# Patient Record
Sex: FEMALE | Race: WHITE | NOT HISPANIC OR LATINO | Employment: OTHER | URBAN - METROPOLITAN AREA
[De-identification: names, ages, dates, MRNs, and addresses within clinical notes are randomized per-mention and may not be internally consistent; named-entity substitution may affect disease eponyms.]

---

## 2017-01-16 ENCOUNTER — CONVERSION ENCOUNTER (OUTPATIENT)
Dept: MAMMOGRAPHY | Facility: HOSPITAL | Age: 57
End: 2017-01-16

## 2018-03-23 ENCOUNTER — CONVERSION ENCOUNTER (OUTPATIENT)
Dept: MAMMOGRAPHY | Facility: HOSPITAL | Age: 58
End: 2018-03-23

## 2019-02-11 ENCOUNTER — OFFICE VISIT CONVERTED (OUTPATIENT)
Dept: ORTHOPEDIC SURGERY | Facility: CLINIC | Age: 59
End: 2019-02-11
Attending: ORTHOPAEDIC SURGERY

## 2019-03-06 ENCOUNTER — OFFICE VISIT CONVERTED (OUTPATIENT)
Dept: ORTHOPEDIC SURGERY | Facility: CLINIC | Age: 59
End: 2019-03-06
Attending: PHYSICIAN ASSISTANT

## 2019-03-18 ENCOUNTER — HOSPITAL ENCOUNTER (OUTPATIENT)
Dept: ORTHOPEDIC SURGERY | Facility: CLINIC | Age: 59
Setting detail: RECURRING SERIES
Discharge: HOME | End: 2019-05-20
Attending: ORTHOPAEDIC SURGERY

## 2019-04-10 ENCOUNTER — OFFICE VISIT CONVERTED (OUTPATIENT)
Dept: ORTHOPEDIC SURGERY | Facility: CLINIC | Age: 59
End: 2019-04-10
Attending: PHYSICIAN ASSISTANT

## 2019-05-08 ENCOUNTER — OFFICE VISIT CONVERTED (OUTPATIENT)
Dept: ORTHOPEDIC SURGERY | Facility: CLINIC | Age: 59
End: 2019-05-08
Attending: PHYSICIAN ASSISTANT

## 2019-07-11 ENCOUNTER — HOSPITAL ENCOUNTER (OUTPATIENT)
Dept: GENERAL RADIOLOGY | Facility: HOSPITAL | Age: 59
Discharge: HOME | End: 2019-07-11
Attending: FAMILY MEDICINE

## 2020-02-10 ENCOUNTER — HOSPITAL ENCOUNTER (OUTPATIENT)
Dept: MAMMOGRAPHY | Facility: HOSPITAL | Age: 60
Discharge: HOME | End: 2020-02-10
Attending: OBSTETRICS & GYNECOLOGY

## 2020-07-08 ENCOUNTER — HOSPITAL ENCOUNTER (OUTPATIENT)
Dept: CARDIOLOGY | Facility: HOSPITAL | Age: 60
Discharge: HOME | End: 2020-07-08
Attending: FAMILY MEDICINE

## 2021-02-13 ENCOUNTER — HOSPITAL ENCOUNTER (OUTPATIENT)
Dept: MAMMOGRAPHY | Facility: HOSPITAL | Age: 61
Discharge: HOME | End: 2021-02-13
Attending: NURSE PRACTITIONER

## 2021-05-15 VITALS — OXYGEN SATURATION: 99 % | HEART RATE: 81 BPM | HEIGHT: 65 IN | BODY MASS INDEX: 27.66 KG/M2 | WEIGHT: 166 LBS

## 2021-05-16 VITALS — HEART RATE: 80 BPM | HEIGHT: 65 IN | OXYGEN SATURATION: 97 % | BODY MASS INDEX: 27.99 KG/M2 | WEIGHT: 168 LBS

## 2021-05-16 VITALS — OXYGEN SATURATION: 97 % | WEIGHT: 171 LBS | HEART RATE: 76 BPM | BODY MASS INDEX: 28.49 KG/M2 | HEIGHT: 65 IN

## 2021-05-23 ENCOUNTER — HOSPITAL ENCOUNTER (OUTPATIENT)
Dept: URGENT CARE | Facility: CLINIC | Age: 61
Discharge: HOME | End: 2021-05-23
Attending: FAMILY MEDICINE

## 2021-06-01 ENCOUNTER — OFFICE VISIT CONVERTED (OUTPATIENT)
Dept: FAMILY MEDICINE CLINIC | Facility: CLINIC | Age: 61
End: 2021-06-01
Attending: NURSE PRACTITIONER

## 2021-06-01 ENCOUNTER — CONVERSION ENCOUNTER (OUTPATIENT)
Dept: FAMILY MEDICINE CLINIC | Facility: CLINIC | Age: 61
End: 2021-06-01

## 2021-06-01 ENCOUNTER — HOSPITAL ENCOUNTER (OUTPATIENT)
Dept: FAMILY MEDICINE CLINIC | Facility: CLINIC | Age: 61
Discharge: HOME | End: 2021-06-01
Attending: NURSE PRACTITIONER

## 2021-06-01 LAB
25(OH)D3 SERPL-MCNC: 43.1 NG/ML (ref 30–100)
CHOLEST SERPL-MCNC: 263 MG/DL (ref 107–200)
CHOLEST/HDLC SERPL: 2.9 {RATIO} (ref 3–6)
HDLC SERPL-MCNC: 90 MG/DL (ref 40–60)
LDLC SERPL CALC-MCNC: 164 MG/DL (ref 70–100)
T4 FREE SERPL-MCNC: 1.1 NG/DL (ref 0.9–1.8)
TRIGL SERPL-MCNC: 47 MG/DL (ref 40–150)
TSH SERPL-ACNC: 2.51 M[IU]/L (ref 0.27–4.2)
VLDLC SERPL-MCNC: 9 MG/DL (ref 5–37)

## 2021-06-02 ENCOUNTER — HOSPITAL ENCOUNTER (OUTPATIENT)
Dept: FAMILY MEDICINE CLINIC | Facility: CLINIC | Age: 61
Discharge: HOME | End: 2021-06-02
Attending: NURSE PRACTITIONER

## 2021-06-02 LAB — CONV UREA BREATH TEST: NEGATIVE

## 2021-06-05 NOTE — H&P
History and Physical      Patient Name: Aleyda Cortés   Patient ID: [id number]   Sex: Female   YOB: 1960    Primary Care Provider: Jack Arambula MD   Referring Provider: Jack Arambula MD    Visit Date: June 1, 2021    Provider: JYOTI Edgar   Location: [facility]   Location Address: [address] [place] [address], [address]  [place], [place]  [id number]   Location Phone: [phone]          Chief Complaint  · est care      History Of Present Illness  Aleyda Cortés is a 60 year old /White female who presents for evaluation and treatment of:      Presents today to [facility] care.  Previous PCP is Dr. Baumann.  She has a history of vitamin D deficiency, squamous cell carcinoma, hysterectomy with hormone therapy, depression, and ADHD.  She recently went to the emergency room on 5/23/2021 for abdominal pain.  She had an abnormal CT scan of abdomen with thickening of the wall near gastric pylorus.  She was started on Carafate, Zofran, and dicyclomine.  Symptoms have improved.  She reports over the past 6 months she has had increased and heartburn which she has been taking Tums and Rolaids.    History of colonoscopy with polypectomy by Dr. Lobo.  She reports she is overdue for colonoscopy.    She recently saw OB/GYN and was restarted on hormone therapy for vaginal dryness.    Former smoker quit 2003 she has smoked approximately 25 years.    She reports she has a history of ADHD.  She has been previously prescribed Adderall.  She reports Adderall helped her with her focus and driving.       Past Medical History  Anxiety; Arthritis; Bladder disorder; Cervical disc disorder; Cervical radiculopathy; Cervicalgia; Depression; Hearing loss; Hyperlipemia; Low back pain; Tinnitus; TMJ disorder involving articular disc abnormality         Past Surgical History  Bladder Surg.; Cesarian Section; Colonoscopy; Dilation and Curettage; Fatty TUMOR; Hysterectomy; Tonsilectomy;  Tonsillectomy         Medication List  Adderall 20 mg oral tablet; Calcium 600 600 mg (1,500 mg) oral tablet; cyclobenzaprine 10 mg oral tablet; dextroamphetamine-amphetamine 20 mg oral capsule,extended release 24hr; diclofenac sodium 75 mg oral tablet,delayed release (DR/EC); escitalopram oxalate 10 mg oral tablet; Lexapro 10 mg oral tablet; Medrol (Beau) 4 mg oral tablets,dose pack; multivitamin oral tablet; Suprep 17.5-3.13-1.6 gram oral recon soln; Vivelle 0.075 mg/24 hr transdermal patch semiweekly         Allergy List  Darvon; Ritalin; SULFA (SULFONAMIDES)         Family Medical History  Stroke; Heart Disease; Cancer, Unspecified; Diabetes, unspecified type; Spine Problems; - No Family History of Colorectal Cancer; Heart Attack (MI); Diabetes Mellitus, Type II; Family history of certain chronic disabling diseases; arthritis         Social History  Alcohol (Current some day); Alcohol Use (Current some day); .; lives alone; Recreational Drug Use (Never); Tobacco (Former); Working         Review of Systems  · Constitutional  o Denies  o : fever, fatigue, weight loss, weight gain  · Eyes  o Denies  o : double vision, blurred vision, changes in vision  · HENT  o Denies  o : vertigo, recent head injury  · Cardiovascular  o Denies  o : lower extremity edema, claudication, chest pressure, palpitations  · Respiratory  o Denies  o : shortness of breath, wheezing, cough, hemoptysis, dyspnea on exertion  · Gastrointestinal  o Admits  o : nausea, vomiting, diarrhea, abdominal pain, bloating  · Genitourinary  o Denies  o : urgency, frequency, dysuria, urinary retention  · Integument  o Denies  o : rash, itching, hair growth change, new skin lesions  · Neurologic  o Denies  o : altered mental status, tingling or numbness, seizures, tremors  · Musculoskeletal  o Denies  o : joint swelling, limitation of motion  · Endocrine  o Denies  o : polyuria, polydipsia, cold intolerance, heat  "intolerance  · Psychiatric  o Admits  o : hallucinations  o Denies  o : anxiety, depression, delusions, feeling confused, difficulty sleeping, compulsive behaviors, impulsive behaviors, suicidal ideation, homicidal ideation      Vitals  Date Time BP Position Site L\R Cuff Size HR RR TEMP (F) WT  HT  BMI kg/m2 BSA m2 O2 Sat FR L/min FiO2 HC       06/01/2021 11:04 /72 Sitting    75 - R  97.8 165lbs 16oz 5'  5\" 27.62 1.86 99 %            Physical Examination  · Constitutional  o Appearance  o : alert, in no acute distress  · Head and Face  o Head  o :   § Inspection  § : atraumatic, normocephalic  o Face  o :   § Inspection  § : no facial lesions  o HEENT  o : Unremarkable  · Eyes  o Conjunctivae  o : conjunctivae normal  o Sclerae  o : sclerae white  o Pupils and Irises  o : pupils equal and round, pupils reactive to light bilaterally  o Eyelids/Ocular Adnexae  o : eyelid appearance normal  · Ears, Nose, Mouth and Throat  o Ears  o :   § External Ears  § : appearance within normal limits, no lesions present  § Otoscopic Examination  § : tympanic membrane appearance within normal limits bilaterally  o Nose  o :   § External Nose  § : appearance normal  o Oral Cavity  o :   § Oral Mucosa  § : oral mucosa normal  § Lips  § : lip appearance normal  § Teeth  § : normal dentition for age  § Gums  § : gums pink, non-swollen, no bleeding present  § Tongue  § : tongue appearance normal  § Palate  § : hard palate normal, soft palate appearance normal  o Throat  o : No erythema  · Neck  o Inspection/Palpation  o : normal appearance, no masses or tenderness, trachea midline  o Thyroid  o : gland size normal, nontender, no nodules or masses present on palpation  · Respiratory  o Respiratory Effort  o : breathing unlabored  o Auscultation of Lungs  o : normal breath sounds  · Cardiovascular  o Heart  o :   § Auscultation of Heart  § : regular rate, normal rhythm, no murmurs present  o Peripheral Vascular System  o : "   § Extremities  § : no edema  · Gastrointestinal  o Abdominal Examination  o : abdomen nontender to palpation, normal bowel sounds, tone normal without rigidity or guarding, no masses present  o Liver and spleen  o : no hepatomegaly present  · Lymphatic  o Neck  o : no lymphadenopathy   o Supraclavicular Nodes  o : no supraclavicular nodes          Assessment  · Annual physical exam     V70.0/Z00.00  [facility]. Check lipid panel and thyroid level  · GERD (gastroesophageal reflux disease)     530.81/K21.9  H. pylori breath test. Start omeprazole 20 mg daily. Continue Carafate. Abnormal CT scan of abdomen with thickening wall of the gastric pylorus. Prescribed Prilosec continue Carafate, consult gastroenterology for further evaluation. She is also in need of a colonoscopy for colorectal cancer screening.  · Vitamin D deficiency     268.9/E55.9  Check vitamin D level  · Screening for lipid disorders     V77.91/Z13.220  · Screening for colon cancer     V76.51/Z12.11  · Elevated blood sugar     790.29/R73.9  · Abnormal CT of the abdomen     793.6/R93.5  · ADHD     314.01/F90.9      Plan  · Orders  o H pylori breath test (86625, 29792) - 530.81/K21.9 - 06/01/2021  o Gastroenterology Consultation (GASTR) - 530.81/K21.9, V76.51/Z12.11, 793.6/R93.5 - 06/01/2021   she has seen Dr. Lobo for her colonoscopies. Previous colonoscopy she had polepectomy  o Lipid Panel [facility] (98359) - V77.91/Z13.220 - 06/01/2021  o Thyroid Profile (81775, 36525, THYII) - V70.0/Z00.00 - 06/01/2021  o Vitamin D (25-Hydroxy) Level (89727) - 268.9/E55.9 - 06/01/2021  o ACO-18: Negative screen for clinical depression using a standardized tool () - - 06/01/2021   2  o ACO-39: Current medications updated and reviewed (1159F, ) - - 06/01/2021  o Psychiatric Consultation (PSYCH) - 314.01/F90.9 - 06/01/2021  · Medications  o omeprazole 20 mg oral capsule,delayed release(DR/EC)   SIG: take 1  capsule (20 mg) by oral route once daily before a meal   DISP: (30) Capsule with 2 refills  Prescribed on 06/01/2021     o Carafate 1 gram oral tablet   SIG: take 1 tablet (1 gram) by oral route 4 times per day on an empty stomach 1 hour before meals and at bedtime for 14 days   DISP: (56) Tablet with 1 refills  Prescribed on 06/01/2021     · Instructions  o Reviewed health maintenance flowsheet and updated information. Orders were placed and/or patient's response was documented.  o Patient was educated/instructed on their diagnosis, treatment and medications prior to discharge from the clinic today.  o Patient instructed to seek medical attention urgently for new or worsening symptoms.  o Call the office with any concerns or questions.  · Disposition  o Call or Return if symptoms worsen or persist.  o f/u 1 month            Electronically Signed by: JYOTI Edgar -Author on June 1, 2021 02:25:35 PM

## 2021-07-06 ENCOUNTER — OFFICE VISIT (OUTPATIENT)
Dept: FAMILY MEDICINE CLINIC | Facility: CLINIC | Age: 61
End: 2021-07-06

## 2021-07-06 VITALS
HEIGHT: 65 IN | TEMPERATURE: 97.2 F | OXYGEN SATURATION: 97 % | SYSTOLIC BLOOD PRESSURE: 132 MMHG | WEIGHT: 166.6 LBS | HEART RATE: 84 BPM | DIASTOLIC BLOOD PRESSURE: 72 MMHG | BODY MASS INDEX: 27.76 KG/M2

## 2021-07-06 DIAGNOSIS — E78.2 MIXED HYPERLIPIDEMIA: ICD-10-CM

## 2021-07-06 DIAGNOSIS — K21.9 GASTROESOPHAGEAL REFLUX DISEASE WITHOUT ESOPHAGITIS: Primary | ICD-10-CM

## 2021-07-06 PROBLEM — M26.639 TMJ DISORDER INVOLVING ARTICULAR DISC ABNORMALITY: Status: ACTIVE | Noted: 2021-07-06

## 2021-07-06 PROBLEM — E78.5 HYPERLIPEMIA: Status: ACTIVE | Noted: 2021-07-06

## 2021-07-06 PROBLEM — E55.9 VITAMIN D DEFICIENCY: Status: ACTIVE | Noted: 2021-06-01

## 2021-07-06 PROBLEM — M54.12 CERVICAL RADICULOPATHY: Status: ACTIVE | Noted: 2017-05-15

## 2021-07-06 PROBLEM — M19.90 ARTHRITIS: Status: ACTIVE | Noted: 2021-07-06

## 2021-07-06 PROBLEM — M54.50 LOW BACK PAIN: Status: ACTIVE | Noted: 2017-05-15

## 2021-07-06 PROBLEM — H93.19 TINNITUS: Status: ACTIVE | Noted: 2021-07-06

## 2021-07-06 PROCEDURE — 99213 OFFICE O/P EST LOW 20 MIN: CPT | Performed by: NURSE PRACTITIONER

## 2021-07-06 RX ORDER — ESTRADIOL 0.05 MG/D
1 FILM, EXTENDED RELEASE TRANSDERMAL 2 TIMES WEEKLY
COMMUNITY
Start: 2021-04-20 | End: 2022-03-30

## 2021-07-06 RX ORDER — LATANOPROST 50 UG/ML
SOLUTION/ DROPS OPHTHALMIC
COMMUNITY
Start: 2021-04-20

## 2021-07-06 RX ORDER — DICYCLOMINE HCL 20 MG
20 TABLET ORAL DAILY PRN
COMMUNITY
Start: 2021-05-23 | End: 2021-12-03

## 2021-07-06 RX ORDER — OMEPRAZOLE 20 MG/1
20 CAPSULE, DELAYED RELEASE ORAL DAILY
COMMUNITY
Start: 2021-06-03 | End: 2021-07-06 | Stop reason: SDUPTHER

## 2021-07-06 RX ORDER — AMMONIUM LACTATE 12 G/100G
CREAM TOPICAL
COMMUNITY

## 2021-07-06 RX ORDER — SUCRALFATE 1 G/1
1 TABLET ORAL DAILY
COMMUNITY
Start: 2021-06-03 | End: 2021-07-06

## 2021-07-06 RX ORDER — OMEPRAZOLE 20 MG/1
20 CAPSULE, DELAYED RELEASE ORAL DAILY
Qty: 90 CAPSULE | Refills: 1 | Status: SHIPPED | OUTPATIENT
Start: 2021-07-06 | End: 2021-07-12 | Stop reason: SDUPTHER

## 2021-07-06 NOTE — PATIENT INSTRUCTIONS
Food Choices for Gastroesophageal Reflux Disease, Adult  When you have gastroesophageal reflux disease (GERD), the foods you eat and your eating habits are very important. Choosing the right foods can help ease your discomfort. Think about working with a food expert (dietitian) to help you make good choices.  What are tips for following this plan?  Reading food labels  · Read the label for foods that are low in saturated fat. Foods that may help with your symptoms include:  ? Foods that have less than 5% of daily value (DV) of fat.  ? Foods that have 0 grams of trans fat.  Cooking  · Do not mora your food. Cook your food by baking, steaming, grilling, or broiling. These are all methods that do not need a lot of fat for cooking.  · To add flavor, try to use herbs that are low in spice and acidity.  Meal planning    · Choose healthy foods that are low in fat, such as:  ? Fruits and vegetables.  ? Whole grains.  ? Low-fat dairy products.  ? Lean meats, fish, and poultry.  · Eat small meals often instead of eating 3 large meals each day. Eat your meals slowly in a place where you are relaxed. Avoid bending over or lying down until 2-3 hours after eating.  · Limit high-fat foods such as fatty meats or fried foods.  · Limit your intake of oils, butter, and shortening to less than 8 teaspoons each day.  · Avoid the following:  ? Foods that cause symptoms. These may be different for different people. Keep a food diary to keep track of foods that cause symptoms.  ? Alcohol.  ? Drinking a lot of liquid with meals.  ? Eating meals during the 2-3 hours before bed.  Lifestyle  · Stay at a healthy weight. Ask your doctor what weight is healthy for you. If you need to lose weight, work with your doctor to do so safely.  · Exercise for at least 30 minutes on 5 or more days each week, or as told by your doctor.  · Wear loose-fitting clothes.  · Do not use any products that contain nicotine or tobacco, such as cigarettes,  e-cigarettes, and chewing tobacco. If you need help quitting, ask your doctor.  · Sleep with the head of your bed higher than your feet. Use a wedge under the mattress or blocks under the bed frame to raise the head of the bed.  What foods should eat?    Eat a healthy, well-balanced diet of fruits, vegetables, whole grains, low-fat dairy products, lean meats, fish, and poultry. Each person is different. Foods that may cause symptoms in one person may not cause any symptoms in another person. Work with your doctor to find foods that are safe for you.  The items listed above may not be a complete list of foods and beverages you can eat. Contact a dietitian for more information.  What foods should I avoid?  Limiting some of these foods may help in managing the symptoms of GERD. Everyone is different. Talk with a food expert or your doctor to help you find the exact foods to avoid, if any.  Fruits  Any fruits prepared with added fat. Any fruits that cause symptoms. For some people, this may include citrus fruits, such as oranges, grapefruit, pineapple, and mayda.  Vegetables  Deep-fried vegetables. French fries. Any vegetables prepared with added fat. Any vegetables that cause symptoms. For some people, this may include tomatoes and tomato products, chili peppers, onions and garlic, and horseradish.  Grains  Pastries or quick breads with added fat.  Meats and other proteins  High-fat meats, such as fatty beef or pork, hot dogs, ribs, ham, sausage, salami, and gillette. Fried meat or protein, including fried fish and fried chicken. Nuts and nut butters.  Dairy  Whole milk and chocolate milk. Sour cream. Cream. Ice cream. Cream cheese. Milkshakes.  Fats and oils  Butter. Margarine. Shortening. Ghee.  Beverages  Coffee and tea, with or without caffeine. Carbonated beverages. Sodas. Energy drinks. Fruit juice made with acidic fruits (such as orange or grapefruit). Tomato juice. Alcoholic drinks.  Sweets and  desserts  Chocolate and cocoa. Donuts.  Seasonings and condiments  Pepper. Peppermint and spearmint. Added salt. Any condiments, herbs, or seasonings that cause symptoms. For some people, this may include sharif, hot sauce, or vinegar-based salad dressings.  The items listed above may not be a complete list of foods and beverages you should avoid. Contact a dietitian for more information.  Questions to ask your doctor  Diet and lifestyle changes are often the first steps that are taken to manage symptoms of GERD. If diet and lifestyle changes do not help, talk with your doctor about taking medicines.  Where to find more information  · International Foundation for Gastrointestinal Disorders: aboutgerd.org  Summary  · When you have GERD, food and lifestyle choices are very important in easing your symptoms.  · Eat small meals often instead of 3 large meals a day. Eat your meals slowly and in a place where you are relaxed.  · Avoid bending over or lying down until 2-3 hours after eating.  · Limit high-fat foods such as fatty meat or fried foods.  This information is not intended to replace advice given to you by your health care provider. Make sure you discuss any questions you have with your health care provider.  Document Revised: 10/12/2020 Document Reviewed: 10/12/2020  Elsevier Patient Education © 2021 Else[employer] Inc.

## 2021-07-06 NOTE — PROGRESS NOTES
"Chief Complaint  Follow-up and GI Problem    Subjective          Aleydabinta Cortés presents to [facility]  History of Present Illness  Resents today for a 1 month follow-up on abdominal pain acid reflux.  Over the past month she has been taking omeprazole 20 mg daily.  She had completed her Carafate.  Symptoms have completely resolved.  She notices if she overeats that she will have reflux symptoms.  She has an appointment later this month with GI.  She is in need of a colonoscopy.  She also had an abnormal CT scan that showed thickening of the gastric pylorus.  CT scan also noted sigmoid diverticulosis.    She has hyperlipidemia.  And is following a low-cholesterol diet.    Social History     Socioeconomic History   • Marital status:      Spouse name: Not on file   • Number of children: Not on file   • Years of education: Not on file   • Highest education level: Not on file   Tobacco Use   • Smoking status: Former Smoker     Packs/day: 1.00     Years: 30.00     Pack years: 30.00     Quit date:      Years since quittin.5   • Smokeless tobacco: Never Used   Substance and Sexual Activity   • Alcohol use: Yes     Comment: 5 drinks a week        Objective   Vital Signs:   /72   Pulse 84   Temp 97.2 °F (36.2 °C)   Ht 165.1 cm (65\")   Wt 75.6 kg (166 lb 9.6 oz)   SpO2 97%   BMI 27.72 kg/m²     Physical Exam  Vitals reviewed.   Constitutional:       Appearance: Normal appearance. She is well-developed.   HENT:      Head: Normocephalic and atraumatic.      Right Ear: External ear normal.      Left Ear: External ear normal.      Mouth/Throat:      Pharynx: No oropharyngeal exudate.   Eyes:      Conjunctiva/sclera: Conjunctivae normal.      Pupils: Pupils are equal, round, and reactive to light.   Cardiovascular:      Rate and Rhythm: Normal rate and regular rhythm.      Heart sounds: No murmur heard.   No friction rub. No gallop.    Pulmonary:      Effort: " Pulmonary effort is normal.      Breath sounds: Normal breath sounds. No wheezing or rhonchi.   Abdominal:      General: Bowel sounds are normal. There is no distension.      Palpations: Abdomen is soft.      Tenderness: There is no abdominal tenderness.   Skin:     General: Skin is warm and dry.   Neurological:      Mental Status: She is alert and oriented to person, place, and time.   Psychiatric:         Mood and Affect: Affect normal.        Result Review :     Common labs    Common Labsle 5/23/21 5/23/21 6/1/21 2039 2039    Glucose  102 (A)    BUN  15    Creatinine  0.75    Sodium  138    Potassium  4.1    Chloride  101    Calcium  9.7    Albumin  4.2    Total Bilirubin  0.98    Alkaline Phosphatase  61    AST (SGOT)  18    ALT (SGPT)  13    WBC 7.13     Hemoglobin 13.8     Hematocrit 40.7     Platelets 239     Total Cholesterol   263 (A)   Triglycerides   47   HDL Cholesterol   90 (A)   LDL Cholesterol    164 (A)   (A) Abnormal value       Comments are available for some flowsheets but are not being displayed.         The 10-year ASCVD risk score (Ceepj DOBSON Jr., et al., 2013) is: 3.9%    Values used to calculate the score:      Age: 60 years      Sex: Female      Is Non- : No      Diabetic: No      Tobacco smoker: No      Systolic Blood Pressure: 132 mmHg      Is BP treated: No      HDL Cholesterol: 90 mg/dL      Total Cholesterol: 263 mg/dL              Assessment and Plan    Diagnoses and all orders for this visit:    1. Gastroesophageal reflux disease without esophagitis (Primary)  Assessment & Plan:  Continue omeprazole 20 mg daily.  GERD is well controlled.    Orders:  -     omeprazole (priLOSEC) 20 MG capsule; Take 1 capsule by mouth Daily.  Dispense: 90 capsule; Refill: 1    2. Mixed hyperlipidemia  Assessment & Plan:  Follow low-cholesterol diet.        Follow Up   Return in about 3 months (around 10/6/2021) for Next scheduled follow up.  Patient was given instructions and  counseling regarding her condition or for health maintenance advice. Please see specific information pulled into the AVS if appropriate.

## 2021-07-08 ENCOUNTER — PREP FOR SURGERY (OUTPATIENT)
Dept: OTHER | Facility: HOSPITAL | Age: 61
End: 2021-07-08

## 2021-07-08 ENCOUNTER — TELEPHONE (OUTPATIENT)
Dept: GASTROENTEROLOGY | Facility: CLINIC | Age: 61
End: 2021-07-08

## 2021-07-08 DIAGNOSIS — Z86.010 HISTORY OF COLON POLYPS: Primary | ICD-10-CM

## 2021-07-08 DIAGNOSIS — Z12.11 ENCOUNTER FOR SCREENING FOR MALIGNANT NEOPLASM OF COLON: Primary | ICD-10-CM

## 2021-07-08 RX ORDER — SODIUM, POTASSIUM,MAG SULFATES 17.5-3.13G
1 SOLUTION, RECONSTITUTED, ORAL ORAL EVERY 12 HOURS
Qty: 254 ML | Refills: 0 | Status: SHIPPED | OUTPATIENT
Start: 2021-07-08 | End: 2022-01-11

## 2021-07-08 NOTE — PROGRESS NOTES
Spoke with Pt on a date for colon of 9/13/2021. Updated chart with meds and history. Put in order for colon and sent in prep

## 2021-07-12 DIAGNOSIS — K21.9 GASTROESOPHAGEAL REFLUX DISEASE WITHOUT ESOPHAGITIS: ICD-10-CM

## 2021-07-12 RX ORDER — OMEPRAZOLE 20 MG/1
20 CAPSULE, DELAYED RELEASE ORAL DAILY
Qty: 90 CAPSULE | Refills: 1 | Status: SHIPPED | OUTPATIENT
Start: 2021-07-12 | End: 2021-10-15

## 2021-07-14 ENCOUNTER — CLINICAL SUPPORT (OUTPATIENT)
Dept: GASTROENTEROLOGY | Facility: CLINIC | Age: 61
End: 2021-07-14

## 2021-07-15 VITALS
SYSTOLIC BLOOD PRESSURE: 120 MMHG | HEIGHT: 65 IN | TEMPERATURE: 97.8 F | WEIGHT: 166 LBS | OXYGEN SATURATION: 99 % | HEART RATE: 75 BPM | DIASTOLIC BLOOD PRESSURE: 72 MMHG | BODY MASS INDEX: 27.66 KG/M2

## 2021-09-09 ENCOUNTER — TELEPHONE (OUTPATIENT)
Dept: GASTROENTEROLOGY | Facility: CLINIC | Age: 61
End: 2021-09-09

## 2021-09-09 NOTE — TELEPHONE ENCOUNTER
Called to inform patient that due to Covid restrictions at [facility] the procedure would be postponed and she would be added to our reschedule list.  Patient will be contacted once we start rescheduling.

## 2021-10-15 ENCOUNTER — OFFICE VISIT (OUTPATIENT)
Dept: FAMILY MEDICINE CLINIC | Facility: CLINIC | Age: 61
End: 2021-10-15

## 2021-10-15 VITALS
DIASTOLIC BLOOD PRESSURE: 82 MMHG | SYSTOLIC BLOOD PRESSURE: 130 MMHG | HEIGHT: 65 IN | WEIGHT: 170.8 LBS | TEMPERATURE: 97 F | OXYGEN SATURATION: 98 % | HEART RATE: 76 BPM | BODY MASS INDEX: 28.46 KG/M2

## 2021-10-15 DIAGNOSIS — K21.9 GASTROESOPHAGEAL REFLUX DISEASE WITHOUT ESOPHAGITIS: ICD-10-CM

## 2021-10-15 DIAGNOSIS — Z23 NEED FOR INFLUENZA VACCINATION: ICD-10-CM

## 2021-10-15 DIAGNOSIS — K29.70 GASTRITIS WITHOUT BLEEDING, UNSPECIFIED CHRONICITY, UNSPECIFIED GASTRITIS TYPE: Primary | ICD-10-CM

## 2021-10-15 PROCEDURE — 90686 IIV4 VACC NO PRSV 0.5 ML IM: CPT | Performed by: NURSE PRACTITIONER

## 2021-10-15 PROCEDURE — 99214 OFFICE O/P EST MOD 30 MIN: CPT | Performed by: NURSE PRACTITIONER

## 2021-10-15 PROCEDURE — 90471 IMMUNIZATION ADMIN: CPT | Performed by: NURSE PRACTITIONER

## 2021-10-15 RX ORDER — FAMOTIDINE 20 MG/1
20 TABLET, FILM COATED ORAL NIGHTLY
Qty: 90 TABLET | Refills: 1 | Status: SHIPPED | OUTPATIENT
Start: 2021-10-15

## 2021-10-15 RX ORDER — OMEPRAZOLE 40 MG/1
40 CAPSULE, DELAYED RELEASE ORAL DAILY
Qty: 30 CAPSULE | Refills: 2 | Status: SHIPPED | OUTPATIENT
Start: 2021-10-15

## 2021-10-15 NOTE — PROGRESS NOTES
"Chief Complaint  Follow-up and medication concern    Subjective          Aleyda Lisa Cortés presents to [facility]  History of Present Illness  Presents today for a follow-up visit on reflux.  She reports she still having indigestion and reflux with omeprazole 20 mg daily.  She is having her stools changed from constipation to diarrhea.  Her colonoscopy has been rescheduled due to the pandemic.  CT scan of abdomen and pelvis on May 23, 2021 Showed wall thickening along the gastric pylorus which could reflect gastritis or peptic ulcer disease.  She was also noted to have sigmoid diverticulosis.  Objective   Vital Signs:   /82   Pulse 76   Temp 97 °F (36.1 °C)   Ht 165.1 cm (65\")   Wt 77.5 kg (170 lb 12.8 oz)   SpO2 98%   BMI 28.42 kg/m²     Physical Exam  Vitals reviewed.   Constitutional:       Appearance: Normal appearance. She is well-developed.   HENT:      Head: Normocephalic and atraumatic.      Right Ear: External ear normal.      Left Ear: External ear normal.      Mouth/Throat:      Pharynx: No oropharyngeal exudate.   Eyes:      Conjunctiva/sclera: Conjunctivae normal.      Pupils: Pupils are equal, round, and reactive to light.   Cardiovascular:      Rate and Rhythm: Normal rate and regular rhythm.      Heart sounds: No murmur heard.  No friction rub. No gallop.    Pulmonary:      Effort: Pulmonary effort is normal.      Breath sounds: Normal breath sounds. No wheezing or rhonchi.   Abdominal:      General: Bowel sounds are normal. There is no distension.      Palpations: Abdomen is soft. There is no hepatomegaly, splenomegaly, mass or pulsatile mass.      Tenderness: There is no abdominal tenderness. There is no right CVA tenderness, left CVA tenderness, guarding or rebound. Negative signs include Jones's sign, Rovsing's sign, McBurney's sign and psoas sign.      Hernia: No hernia is present.   Skin:     General: Skin is warm and dry.   Neurological:      " Mental Status: She is alert and oriented to person, place, and time.        Result Review :                 Assessment and Plan    Diagnoses and all orders for this visit:    1. Gastritis without bleeding, unspecified chronicity, unspecified gastritis type (Primary)  -     omeprazole (priLOSEC) 40 MG capsule; Take 1 capsule by mouth Daily.  Dispense: 30 capsule; Refill: 2    2. Gastroesophageal reflux disease without esophagitis  -     famotidine (Pepcid) 20 MG tablet; Take 1 tablet by mouth Every Night.  Dispense: 90 tablet; Refill: 1    3. Need for influenza vaccination  -     FluLaval/Fluarix/Fluzone >6 Months (1332-3146)    Will increase omeprazole to 40 mg daily.  Also start Pepcid 20 mg nightly.      Follow Up   Return in about 4 weeks (around 11/12/2021), or if symptoms worsen or fail to improve, for Next scheduled follow up.  Patient was given instructions and counseling regarding her condition or for health maintenance advice. Please see specific information pulled into the AVS if appropriate.

## 2021-12-03 NOTE — PRE-PROCEDURE INSTRUCTIONS
Pt. Instructed on laxative and skin prep, pre-op meds, clear liquid diet. Ok to take Prilosec a.m. of procedure.

## 2021-12-06 ENCOUNTER — HOSPITAL ENCOUNTER (OUTPATIENT)
Facility: HOSPITAL | Age: 61
Setting detail: HOSPITAL OUTPATIENT SURGERY
Discharge: HOME | End: 2021-12-06
Attending: INTERNAL MEDICINE | Admitting: INTERNAL MEDICINE

## 2021-12-06 ENCOUNTER — ANESTHESIA EVENT (OUTPATIENT)
Dept: GASTROENTEROLOGY | Facility: HOSPITAL | Age: 61
End: 2021-12-06

## 2021-12-06 ENCOUNTER — ANESTHESIA (OUTPATIENT)
Dept: GASTROENTEROLOGY | Facility: HOSPITAL | Age: 61
End: 2021-12-06

## 2021-12-06 VITALS
BODY MASS INDEX: 27.7 KG/M2 | DIASTOLIC BLOOD PRESSURE: 73 MMHG | OXYGEN SATURATION: 95 % | TEMPERATURE: 97.2 F | SYSTOLIC BLOOD PRESSURE: 114 MMHG | WEIGHT: 166.23 LBS | HEIGHT: 65 IN | RESPIRATION RATE: 20 BRPM | HEART RATE: 76 BPM

## 2021-12-06 DIAGNOSIS — Z86.010 HISTORY OF COLON POLYPS: ICD-10-CM

## 2021-12-06 DIAGNOSIS — K29.70 GASTRITIS WITHOUT BLEEDING, UNSPECIFIED CHRONICITY, UNSPECIFIED GASTRITIS TYPE: Primary | ICD-10-CM

## 2021-12-06 PROCEDURE — 25010000002 PROPOFOL 10 MG/ML EMULSION: Performed by: NURSE ANESTHETIST, CERTIFIED REGISTERED

## 2021-12-06 PROCEDURE — 43239 EGD BIOPSY SINGLE/MULTIPLE: CPT | Performed by: INTERNAL MEDICINE

## 2021-12-06 PROCEDURE — C1889 IMPLANT/INSERT DEVICE, NOC: HCPCS | Performed by: INTERNAL MEDICINE

## 2021-12-06 PROCEDURE — 45390 COLONOSCOPY W/RESECTION: CPT | Performed by: INTERNAL MEDICINE

## 2021-12-06 PROCEDURE — 88305 TISSUE EXAM BY PATHOLOGIST: CPT | Performed by: INTERNAL MEDICINE

## 2021-12-06 DEVICE — DEV CLIP ENDO RESOLUTION360 CONTRL ROT 235CM: Type: IMPLANTABLE DEVICE | Site: ASCENDING COLON | Status: FUNCTIONAL

## 2021-12-06 RX ORDER — PROPOFOL 10 MG/ML
VIAL (ML) INTRAVENOUS AS NEEDED
Status: DISCONTINUED | OUTPATIENT
Start: 2021-12-06 | End: 2021-12-06 | Stop reason: SURG

## 2021-12-06 RX ORDER — MINERAL OIL, PETROLATUM 425; 568 MG/G; MG/G
OINTMENT OPHTHALMIC EVERY 6 HOURS
Status: DISCONTINUED | OUTPATIENT
Start: 2021-12-06 | End: 2021-12-06

## 2021-12-06 RX ORDER — SODIUM CHLORIDE, SODIUM LACTATE, POTASSIUM CHLORIDE, CALCIUM CHLORIDE 600; 310; 30; 20 MG/100ML; MG/100ML; MG/100ML; MG/100ML
30 INJECTION, SOLUTION INTRAVENOUS CONTINUOUS
Status: DISCONTINUED | OUTPATIENT
Start: 2021-12-06 | End: 2021-12-06 | Stop reason: HOSPADM

## 2021-12-06 RX ORDER — LIDOCAINE HYDROCHLORIDE 20 MG/ML
INJECTION, SOLUTION INFILTRATION; PERINEURAL AS NEEDED
Status: DISCONTINUED | OUTPATIENT
Start: 2021-12-06 | End: 2021-12-06 | Stop reason: SURG

## 2021-12-06 RX ADMIN — POLYVINYL ALCOHOL, POVIDONE 1 DROP: 14; 6 SOLUTION/ DROPS OPHTHALMIC at 15:37

## 2021-12-06 RX ADMIN — PROPOFOL 50 MG: 10 INJECTION, EMULSION INTRAVENOUS at 14:18

## 2021-12-06 RX ADMIN — LIDOCAINE HYDROCHLORIDE 60 MG: 20 INJECTION, SOLUTION INFILTRATION; PERINEURAL at 14:41

## 2021-12-06 RX ADMIN — LIDOCAINE HYDROCHLORIDE 100 MG: 20 INJECTION, SOLUTION INFILTRATION; PERINEURAL at 14:18

## 2021-12-06 RX ADMIN — PROPOFOL 150 MCG/KG/MIN: 10 INJECTION, EMULSION INTRAVENOUS at 14:18

## 2021-12-06 RX ADMIN — SODIUM CHLORIDE, POTASSIUM CHLORIDE, SODIUM LACTATE AND CALCIUM CHLORIDE: 600; 310; 30; 20 INJECTION, SOLUTION INTRAVENOUS at 14:16

## 2021-12-06 NOTE — ADDENDUM NOTE
Addendum  created 12/06/21 1516 by Maciel Stephens MD    Order list changed, Order sets accessed

## 2021-12-06 NOTE — ANESTHESIA PREPROCEDURE EVALUATION
Anesthesia Evaluation     Patient summary reviewed and Nursing notes reviewed                Airway   Mallampati: I  TM distance: >3 FB  Neck ROM: full  No difficulty expected  Dental      Pulmonary - negative pulmonary ROS and normal exam    breath sounds clear to auscultation  Cardiovascular - normal exam    Rhythm: regular    (+) hyperlipidemia,       Neuro/Psych  (+) numbness, psychiatric history,     GI/Hepatic/Renal/Endo    (+)  GERD,      Musculoskeletal     (+) neck pain,   Abdominal    Substance History - negative use     OB/GYN negative ob/gyn ROS         Other   arthritis,    history of cancer                    Anesthesia Plan    ASA 1     general     intravenous induction     Anesthetic plan, all risks, benefits, and alternatives have been provided, discussed and informed consent has been obtained with: patient.

## 2021-12-06 NOTE — H&P
ScreeningPre Procedure History & Physical    Chief Complaint:   Screening   epig pain  gerd    Subjective     HPI:   Screening   epig pain  gerd    Past Medical History:   Past Medical History:   Diagnosis Date   • Anxiety    • Arthritis    • Bladder disorder    • Cancer (HCC)     squamous cell skin cancer    • Cervical cancer screening 2021    Normal per patient    • Cervical disc disorder 05/15/2017   • Cervical radiculopathy 05/15/2017   • Cervicalgia    • Depression    • Hearing loss    • Hyperlipemia    • Low back pain 05/15/2017   • Screening for breast cancer 2021    normal per patient    • Tinnitus    • TMJ disorder involving articular disc abnormality    • Vitamin D deficiency 2021    check vitamin d level        Past Surgical History:  Past Surgical History:   Procedure Laterality Date   • BLADDER SURGERY      catherized    •  SECTION     • COLONOSCOPY     • DILATION AND CURETTAGE, DIAGNOSTIC / THERAPEUTIC     • ENDOMETRIAL ABLATION     • HYSTERECTOMY     • OTHER SURGICAL HISTORY      Fatty Tumor    • TONSILLECTOMY         Family History:  Family History   Problem Relation Age of Onset   • Stroke Mother    • Heart disease Mother    • Cancer Mother    • Diabetes Mother    • Arthritis Mother    • Stroke Father    • Heart disease Father    • Other Father         Spine Problems    • Heart attack Father    • Heart disease Brother    • Diabetes Brother    • Cancer Maternal Grandmother    • Malig Hyperthermia Neg Hx        Social History:   reports that she quit smoking about 18 years ago. She has a 30.00 pack-year smoking history. She has never used smokeless tobacco. She reports current alcohol use. She reports that she does not use drugs.    Medications:   Medications Prior to Admission   Medication Sig Dispense Refill Last Dose   • ammonium lactate (AMLACTIN) 12 % cream ammonium lactate 12 % topical cream apply to affected area(s) by topical route QD after showers   Active      •  "ascorbic acid (VITAMIN C) 500 MG capsule controlled-release CR capsule Take 500 mg by mouth Daily.      • Cholecalciferol 25 MCG (1000 UT) capsule Take 1,000 Units by mouth Daily.      • estradiol (MINIVELLE, VIVELLE-DOT) 0.05 MG/24HR patch 1 patch 2 (Two) Times a Week.      • famotidine (Pepcid) 20 MG tablet Take 1 tablet by mouth Every Night. 90 tablet 1    • Fexofenadine HCl (MUCINEX ALLERGY PO) Take  by mouth.      • latanoprost (XALATAN) 0.005 % ophthalmic solution INSTILL 1 DROP IN BOTH EYES AT BEDTIME      • omeprazole (priLOSEC) 40 MG capsule Take 1 capsule by mouth Daily. 30 capsule 2    • sodium-potassium-magnesium sulfates (Suprep Bowel Prep Kit) 17.5-3.13-1.6 GM/177ML solution oral solution Take 1 bottle by mouth Every 12 (Twelve) Hours. 254 mL 0        Allergies:  Propoxyphene, Epinephrine, Ritalin [methylphenidate], and Sulfa antibiotics        Objective     Blood pressure 141/70, pulse 55, temperature 98.5 °F (36.9 °C), temperature source Temporal, resp. rate 16, height 165.1 cm (65\"), weight 75.4 kg (166 lb 3.6 oz), SpO2 99 %, not currently breastfeeding.    Physical Exam   Constitutional: Pt is oriented to person, place, and time and well-developed, well-nourished, and in no distress.   Mouth/Throat: Oropharynx is clear and moist.   Neck: Normal range of motion.   Cardiovascular: Normal rate, regular rhythm and normal heart sounds.    Pulmonary/Chest: Effort normal and breath sounds normal.   Abdominal: Soft. Nontender  Skin: Skin is warm and dry.   Psychiatric: Mood, memory, affect and judgment normal.     Assessment/Plan     Diagnosis:  Screening colonoscopy  H/o colon polyps   epig pain  gerd    Anticipated Surgical Procedure:  Colonoscopy  egd    The risks, benefits, and alternatives of this procedure have been discussed with the patient or the responsible party- the patient understands and agrees to proceed.            "

## 2021-12-06 NOTE — ANESTHESIA POSTPROCEDURE EVALUATION
Patient: Aleyda Cortés    Procedure Summary     Date: 12/06/21 Room / Location: [facility] ENDOSCOPY 2 / [facility] ENDOSCOPY    Anesthesia Start: 1416 Anesthesia Stop: 1448    Procedures:       COLONOSCOPY WITH ORISE INJECTION,  POLYPECTOMY HOT SNARE, 4 CLIPS APPLIED (N/A )      ESOPHAGOGASTRODUODENOSCOPY WITH BIOPSIES (N/A ) Diagnosis:       History of colon polyps      (History of colon polyps [Z86.010])    Surgeons: Dimitrios Reeves MD Provider: Maciel Stephens MD    Anesthesia Type: general ASA Status: 1          Anesthesia Type: general    Vitals  Vitals Value Taken Time   BP 98/59 12/06/21 1450   Temp 36.2 °C (97.2 °F) 12/06/21 1450   Pulse 63 12/06/21 1450   Resp 18 12/06/21 1450   SpO2 96 % 12/06/21 1450           Post Anesthesia Care and Evaluation    Patient location during evaluation: bedside  Patient participation: complete - patient participated  Level of consciousness: awake  Pain score: 0  Pain management: adequate  Airway patency: patent  Anesthetic complications: No anesthetic complications  PONV Status: none  Cardiovascular status: acceptable and stable  Respiratory status: acceptable and room air  Hydration status: acceptable    Comments: An Anesthesiologist personally participated in the most demanding procedures (including induction and emergence if applicable) in the anesthesia plan, monitored the course of anesthesia administration at frequent intervals and remained physically present and available for immediate diagnosis and treatment of emergencies.

## 2021-12-08 LAB
CYTO UR: NORMAL
LAB AP CASE REPORT: NORMAL
LAB AP CLINICAL INFORMATION: NORMAL
PATH REPORT.FINAL DX SPEC: NORMAL
PATH REPORT.GROSS SPEC: NORMAL

## 2022-01-11 ENCOUNTER — OFFICE VISIT (OUTPATIENT)
Dept: FAMILY MEDICINE CLINIC | Facility: CLINIC | Age: 62
End: 2022-01-11

## 2022-01-11 VITALS
HEART RATE: 65 BPM | BODY MASS INDEX: 29.02 KG/M2 | OXYGEN SATURATION: 99 % | HEIGHT: 65 IN | SYSTOLIC BLOOD PRESSURE: 126 MMHG | WEIGHT: 174.2 LBS | DIASTOLIC BLOOD PRESSURE: 80 MMHG | TEMPERATURE: 97 F

## 2022-01-11 DIAGNOSIS — K57.90 DIVERTICULOSIS: ICD-10-CM

## 2022-01-11 DIAGNOSIS — K21.9 GASTROESOPHAGEAL REFLUX DISEASE WITHOUT ESOPHAGITIS: ICD-10-CM

## 2022-01-11 DIAGNOSIS — E55.9 VITAMIN D DEFICIENCY: ICD-10-CM

## 2022-01-11 DIAGNOSIS — K44.9 HIATAL HERNIA: ICD-10-CM

## 2022-01-11 DIAGNOSIS — E78.41 ELEVATED LIPOPROTEIN(A): Primary | ICD-10-CM

## 2022-01-11 PROCEDURE — 99213 OFFICE O/P EST LOW 20 MIN: CPT | Performed by: NURSE PRACTITIONER

## 2022-01-11 NOTE — PROGRESS NOTES
"Chief Complaint  Heartburn and Hyperlipidemia    Subjective          Aleyda Lisa Cortés presents to [facility]  History of Present Illness  Presents today for a 6-month follow-up on hyperlipidemia and acid reflux.  She recently had an EGD colonoscopy.  She was found to have a polyp, diverticulosis, and small hiatal hernia.  She is scheduled for a 1 year follow-up on a colonoscopy.  Her acid reflux is well controlled with the Prilosec 40 mg daily and Pepcid 20 mg daily.  Objective   Vital Signs:   /80   Pulse 65   Temp 97 °F (36.1 °C)   Ht 165.1 cm (65\")   Wt 79 kg (174 lb 3.2 oz)   SpO2 99%   BMI 28.99 kg/m²     Physical Exam  Vitals reviewed.   Constitutional:       Appearance: Normal appearance. She is well-developed.   HENT:      Head: Normocephalic and atraumatic.      Right Ear: External ear normal.      Left Ear: External ear normal.      Mouth/Throat:      Pharynx: No oropharyngeal exudate.   Eyes:      Conjunctiva/sclera: Conjunctivae normal.      Pupils: Pupils are equal, round, and reactive to light.   Cardiovascular:      Rate and Rhythm: Normal rate and regular rhythm.      Heart sounds: No murmur heard.  No friction rub. No gallop.    Pulmonary:      Effort: Pulmonary effort is normal.      Breath sounds: Normal breath sounds. No wheezing or rhonchi.   Abdominal:      General: Bowel sounds are normal. There is no distension.      Palpations: Abdomen is soft.      Tenderness: There is no abdominal tenderness.   Skin:     General: Skin is warm and dry.   Neurological:      Mental Status: She is alert and oriented to person, place, and time.   Psychiatric:         Mood and Affect: Mood and affect normal.         Behavior: Behavior normal.         Thought Content: Thought content normal.         Judgment: Judgment normal.        Result Review :     Common labs    Common Labsle 5/23/21 5/23/21 6/1/21 2039 2039    Glucose  102 (A)    BUN  15    Creatinine  " 0.75    Sodium  138    Potassium  4.1    Chloride  101    Calcium  9.7    Albumin  4.2    Total Bilirubin  0.98    Alkaline Phosphatase  61    AST (SGOT)  18    ALT (SGPT)  13    WBC 7.13     Hemoglobin 13.8     Hematocrit 40.7     Platelets 239     Total Cholesterol   263 (A)   Triglycerides   47   HDL Cholesterol   90 (A)   LDL Cholesterol    164 (A)   (A) Abnormal value       Comments are available for some flowsheets but are not being displayed.                The 10-year ASCVD risk score (Cee DOBSON Jr., et al., 2013) is: 4%    Values used to calculate the score:      Age: 61 years      Sex: Female      Is Non- : No      Diabetic: No      Tobacco smoker: No      Systolic Blood Pressure: 126 mmHg      Is BP treated: No      HDL Cholesterol: 90 mg/dL      Total Cholesterol: 263 mg/dL       Assessment and Plan    Diagnoses and all orders for this visit:    1. Elevated lipoprotein(a) (Primary)  Assessment & Plan:  Continue lifestyle changes and eating a low-cholesterol diet.  We will recheck at next office visit in 3 months      2. Vitamin D deficiency  Assessment & Plan:  Continue vitamin D supplement.      3. Gastroesophageal reflux disease without esophagitis  Assessment & Plan:  Continue current regimen.  Reflux well controlled      4. Hiatal hernia  Assessment & Plan:  Avoid laying down immediately after eating      5. Diverticulosis  Assessment & Plan:  Eat a high-fiber diet        Follow Up   Return in about 3 months (around 4/11/2022), or if symptoms worsen or fail to improve, for Next scheduled follow up.  Patient was given instructions and counseling regarding her condition or for health maintenance advice. Please see specific information pulled into the AVS if appropriate.

## 2022-01-11 NOTE — ASSESSMENT & PLAN NOTE
Continue lifestyle changes and eating a low-cholesterol diet.  We will recheck at next office visit in 3 months

## 2022-02-08 ENCOUNTER — OFFICE VISIT (OUTPATIENT)
Dept: FAMILY MEDICINE CLINIC | Facility: CLINIC | Age: 62
End: 2022-02-08

## 2022-02-08 VITALS
BODY MASS INDEX: 28.56 KG/M2 | WEIGHT: 171.4 LBS | TEMPERATURE: 97.4 F | HEART RATE: 68 BPM | OXYGEN SATURATION: 100 % | SYSTOLIC BLOOD PRESSURE: 120 MMHG | DIASTOLIC BLOOD PRESSURE: 78 MMHG | HEIGHT: 65 IN

## 2022-02-08 DIAGNOSIS — M25.571 CHRONIC PAIN OF RIGHT ANKLE: Primary | ICD-10-CM

## 2022-02-08 DIAGNOSIS — G89.29 CHRONIC PAIN OF RIGHT ANKLE: Primary | ICD-10-CM

## 2022-02-08 DIAGNOSIS — Z87.81 HISTORY OF ANKLE FRACTURE: ICD-10-CM

## 2022-02-08 PROCEDURE — 99213 OFFICE O/P EST LOW 20 MIN: CPT | Performed by: FAMILY MEDICINE

## 2022-02-08 RX ORDER — ESTRADIOL 0.1 MG/G
CREAM VAGINAL
COMMUNITY
Start: 2021-12-08 | End: 2022-02-15 | Stop reason: SDUPTHER

## 2022-02-08 RX ORDER — MELOXICAM 15 MG/1
15 TABLET ORAL DAILY
Qty: 30 TABLET | Refills: 1 | Status: SHIPPED | OUTPATIENT
Start: 2022-02-08

## 2022-02-08 NOTE — PROGRESS NOTES
"Chief Complaint  Ankle Pain (right )    Subjective          Aleyda Lisa Cortés presents to [facility]  History of Present Illness  Patient presents today for an acute visit.  Her primary care is JYOTI Toure.  Patient reports having chronic issues with her right ankle.  She reports previous injuries including an ankle fracture.  Her former primary care, Dr. Arambula had previously ordered an x-ray of her right ankle on 7/11/2019 which showed degenerative changes including a well-corticated 1 cm ossific density which could reflect an intra-articular calcified loose body.  She also has evidence of old ligamentous trauma to the tip of the medial malleolus.  Patient reports that she will have swelling occasionally.  Has gotten worse lately.  She reports that she has a hard time walking on her right ankle.  She still stays fairly active including maintaining employment.  She walks dogs as well.  Objective   Vital Signs:   /78   Pulse 68   Temp 97.4 °F (36.3 °C)   Ht 165.1 cm (65\")   Wt 77.7 kg (171 lb 6.4 oz)   SpO2 100%   BMI 28.52 kg/m²     Physical Exam   General: AAO ×3, no acute distress, pleasant  HEENT: Normocephalic, atraumatic  Musculoskeletal: Patient has restricted range of motion dorsiflexion of the right ankle with tenderness to palpation over the lateral malleolus.  There is mild edema seen.  Pedal pulses intact 2+  Result Review :                 Assessment and Plan    Diagnoses and all orders for this visit:    1. Chronic pain of right ankle (Primary)  -     Ambulatory Referral to Podiatry    2. History of ankle fracture    Other orders  -     meloxicam (MOBIC) 15 MG tablet; Take 1 tablet by mouth Daily.  Dispense: 30 tablet; Refill: 1    I discussed with patient referral to podiatry.  I will put her in an Aircast boot to help with stability and pain control.  We will also give her prescription for meloxicam.  Patient instructed to wear boot as " tolerated especially with ambulation.  We discussed not wearing the boot while driving due to restricting her range of motion of her right ankle and making it difficult for her to press on the gas pedal.  Patient will be on a cruise coming up shortly and will be back at the end of February.  We discussed referral to podiatry around early March.    Follow Up   Return if symptoms worsen or fail to improve.  Patient was given instructions and counseling regarding her condition or for health maintenance advice. Please see specific information pulled into the AVS if appropriate.

## 2022-02-15 DIAGNOSIS — Z12.31 OTHER SCREENING MAMMOGRAM: Primary | ICD-10-CM

## 2022-02-15 RX ORDER — ESTRADIOL 0.1 MG/G
2 CREAM VAGINAL DAILY
Qty: 1 EACH | Refills: 0 | Status: CANCELLED | OUTPATIENT
Start: 2022-02-15

## 2022-02-15 RX ORDER — ESTRADIOL 0.1 MG/G
1 CREAM VAGINAL 2 TIMES WEEKLY
Qty: 12 G | Refills: 0 | Status: SHIPPED | OUTPATIENT
Start: 2022-02-17 | End: 2022-03-15

## 2022-02-15 NOTE — TELEPHONE ENCOUNTER
Patient called this pm.  She is requesting a refill on Estradiol (Estrace) cream. Next appointment is 3-14-22. I have attached an order for her Mammogram and her Rx.

## 2022-03-10 ENCOUNTER — OFFICE VISIT (OUTPATIENT)
Dept: PODIATRY | Facility: CLINIC | Age: 62
End: 2022-03-10

## 2022-03-10 VITALS
OXYGEN SATURATION: 97 % | SYSTOLIC BLOOD PRESSURE: 151 MMHG | TEMPERATURE: 96.7 F | HEIGHT: 65 IN | BODY MASS INDEX: 29.16 KG/M2 | DIASTOLIC BLOOD PRESSURE: 77 MMHG | HEART RATE: 72 BPM | WEIGHT: 175 LBS

## 2022-03-10 DIAGNOSIS — M12.571 TRAUMATIC ARTHRITIS OF ANKLE, RIGHT: Primary | ICD-10-CM

## 2022-03-10 DIAGNOSIS — M79.671 FOOT PAIN, RIGHT: ICD-10-CM

## 2022-03-10 PROCEDURE — 99203 OFFICE O/P NEW LOW 30 MIN: CPT | Performed by: PODIATRIST

## 2022-03-10 NOTE — PROGRESS NOTES
[facility] - PODIATRY    Today's Date: 03/10/22    Patient Name: Aleyda Cortés  MRN: [id number]  [facility]: [id number]  PCP: Osbaldo Edmond APRN, Last PCP Visit: 2022  Referring Provider: Bimal Richards DO    SUBJECTIVE     Chief Complaint   Patient presents with   • Right Ankle - Pain     HPI: Aleyda Cortés, a 61 y.o.female, presents to clinic.    New, Established, New Problem: New    Location: Right ankle    Duration: Since her teenage years    Onset: Pain over the years.  Patient relates 3 different instances of right ankle fractures.    Nature: Stiff, sore, intermittently swollen.    Stable, worsening, improving: Slowly worsening    Aggravating factors:  Patient relates pain is aggravated by shoe gear and ambulation.      Previous Treatment: NSAIDs    Patient denies any fevers, chills, nausea, vomiting, shortness of breath, nor any other constitutional signs nor symptoms.    No other pedal complaints at this time.    Recent medical changes: Started on meloxicam 7.5 mg daily    Past Medical History:   Diagnosis Date   • Anxiety    • Arthritis    • Bladder disorder    • Cancer (HCC)     squamous cell skin cancer    • Cervical cancer screening 2021    Normal per patient    • Cervical disc disorder 05/15/2017   • Cervical radiculopathy 05/15/2017   • Cervicalgia    • Depression    • Hearing loss    • Hyperlipemia    • Low back pain 05/15/2017   • Screening for breast cancer 2021    normal per patient    • Tinnitus    • TMJ disorder involving articular disc abnormality    • Vitamin D deficiency 2021    check vitamin d level      Past Surgical History:   Procedure Laterality Date   • BLADDER SURGERY      catherized    •  SECTION     • COLONOSCOPY     • COLONOSCOPY N/A 2021    Procedure: COLONOSCOPY WITH ORISE INJECTION,  POLYPECTOMY HOT SNARE, 4 CLIPS APPLIED;  Surgeon: Dimitrios Reeves MD;  Location: [facility] ENDOSCOPY;  Service:  Gastroenterology;  Laterality: N/A;  DIVERTICULOSIS, COLON POLYP   • DILATION AND CURETTAGE, DIAGNOSTIC / THERAPEUTIC     • ENDOMETRIAL ABLATION     • ENDOSCOPY N/A 2021    Procedure: ESOPHAGOGASTRODUODENOSCOPY WITH BIOPSIES;  Surgeon: Dimitrios Reeves MD;  Location: [facility] ENDOSCOPY;  Service: Gastroenterology;  Laterality: N/A;  HIATAL HERNIA   • HYSTERECTOMY     • OTHER SURGICAL HISTORY      Fatty Tumor    • TONSILLECTOMY       Family History   Problem Relation Age of Onset   • Stroke Mother    • Heart disease Mother    • Cancer Mother    • Diabetes Mother    • Arthritis Mother    • Stroke Father    • Heart disease Father    • Other Father         Spine Problems    • Heart attack Father    • Heart disease Brother    • Diabetes Brother    • Cancer Maternal Grandmother    • Malig Hyperthermia Neg Hx      Social History     Socioeconomic History   • Marital status:    Tobacco Use   • Smoking status: Former Smoker     Packs/day: 1.00     Years: 30.     Pack years: 30.00     Quit date:      Years since quittin.2   • Smokeless tobacco: Never Used   Vaping Use   • Vaping Use: Never used   Substance and Sexual Activity   • Alcohol use: Yes     Comment: 5 drinks a week    • Drug use: Never   • Sexual activity: Defer     Allergies   Allergen Reactions   • Propoxyphene Mental Status Change   • Epinephrine Other (See Comments)     shaking   • Ritalin [Methylphenidate] Headache   • Sulfa Antibiotics Rash     Current Outpatient Medications   Medication Sig Dispense Refill   • ammonium lactate (AMLACTIN) 12 % cream ammonium lactate 12 % topical cream apply to affected area(s) by topical route QD after showers   Active     • ascorbic acid (VITAMIN C) 500 MG capsule controlled-release CR capsule Take 500 mg by mouth Daily.     • Cholecalciferol 25 MCG (1000 UT) capsule Take 1,000 Units by mouth Daily.     • estradiol (ESTRACE) 0.1 MG/GM vaginal cream Insert 1 g into the vagina 2 (Two) Times a Week  for 8 doses. 12 g 0   • estradiol (MINIVELLE, VIVELLE-DOT) 0.05 MG/24HR patch 1 patch 2 (Two) Times a Week.     • Fexofenadine HCl (MUCINEX ALLERGY PO) Take  by mouth.     • latanoprost (XALATAN) 0.005 % ophthalmic solution INSTILL 1 DROP IN BOTH EYES AT BEDTIME     • meloxicam (MOBIC) 15 MG tablet Take 1 tablet by mouth Daily. 30 tablet 1   • omeprazole (priLOSEC) 40 MG capsule Take 1 capsule by mouth Daily. 30 capsule 2   • famotidine (Pepcid) 20 MG tablet Take 1 tablet by mouth Every Night. 90 tablet 1     No current facility-administered medications for this visit.     Review of Systems   Musculoskeletal: Positive for arthralgias.   All other systems reviewed and are negative.      OBJECTIVE     Vitals:    03/10/22 1004   BP: 151/77   Pulse: 72   Temp: 96.7 °F (35.9 °C)   SpO2: 97%       WBC   Date Value Ref Range Status   05/23/2021 7.13 4.80 - 10.80 10*3/uL Final     RBC   Date Value Ref Range Status   05/23/2021 4.27 4.20 - 5.40 10*6/uL Final     Hemoglobin   Date Value Ref Range Status   05/23/2021 13.8 12.0 - 16.0 g/dL Final     Hematocrit   Date Value Ref Range Status   05/23/2021 40.7 37.0 - 47.0 % Final     MCV   Date Value Ref Range Status   05/23/2021 95.3 81.0 - 99.0 fL Final     MCH   Date Value Ref Range Status   05/23/2021 32.3 (H) 27.0 - 31.0 pg Final     MCHC   Date Value Ref Range Status   05/23/2021 33.9 33.0 - 37.0 Final     RDW   Date Value Ref Range Status   05/23/2021 12.7 11.7 - 14.4 % Final     RDW-SD   Date Value Ref Range Status   05/23/2021 43.9 36.4 - 46.3 fL Final     MPV   Date Value Ref Range Status   05/23/2021 10.8 9.4 - 12.3 fL Final     Platelets   Date Value Ref Range Status   05/23/2021 239 130 - 400 10*3/uL Final     Neutrophil Rel %   Date Value Ref Range Status   05/23/2021 60.2 30.0 - 85.0 % Final     Lymphocyte Rel %   Date Value Ref Range Status   05/23/2021 29.5 20.0 - 45.0 % Final     Monocyte Rel %   Date Value Ref Range Status   05/23/2021 9.0 3.0 - 10.0 % Final      Eosinophil Rel %   Date Value Ref Range Status   05/23/2021 0.6 0.0 - 7.0 % Final     Basophil Rel %   Date Value Ref Range Status   05/23/2021 0.6 0.0 - 3.0 % Final     Neutrophils Absolute   Date Value Ref Range Status   05/23/2021 4.30 2.00 - 8.00 10*3/uL Final     Lymphocytes Absolute   Date Value Ref Range Status   05/23/2021 2.10 1.00 - 5.00 10*3/uL Final     Monocytes Absolute   Date Value Ref Range Status   05/23/2021 0.64 0.20 - 1.20 10*3/uL Final     Eosinophils Absolute   Date Value Ref Range Status   05/23/2021 0.04 0.00 - 0.70 10*3/uL Final     Basophils Absolute   Date Value Ref Range Status   05/23/2021 0.04 0.00 - 0.20 10*3/uL Final     NRBC   Date Value Ref Range Status   05/23/2021 0.00 0.00 - 0.70 % Final         Lab Results   Component Value Date    GLUCOSE 102 (H) 05/23/2021    BUN 15 05/23/2021    CREATININE 0.75 05/23/2021    BCR 20 05/23/2021    K 4.1 05/23/2021    CO2 28 05/23/2021    CALCIUM 9.7 05/23/2021    ALBUMIN 4.2 05/23/2021    LABIL2 1.8 05/23/2021    AST 18 05/23/2021    ALT 13 05/23/2021       Patient seen in no apparent distress.      PHYSICAL EXAM:     Foot/Ankle Exam:       General:   Appearance: appears stated age and healthy    Orientation: AAOx3    Affect: appropriate    Gait: unimpaired    Shoe Gear:  Boots    VASCULAR      Right Foot Vascularity   Normal vascular exam    Dorsalis pedis:  2+  Posterior tibial:  2+  Skin Temperature: warm    Edema Grading:  None  CFT:  < 3 seconds  Pedal Hair Growth:  Present  Varicosities: none       Left Foot Vascularity   Normal vascular exam    Dorsalis pedis:  2+  Posterior tibial:  2+  Skin Temperature: warm    Edema Grading:  None  CFT:  < 3 seconds  Pedal Hair Growth:  Present  Varicosities: none        NEUROLOGIC     Right Foot Neurologic   Normal sensation    Light touch sensation:  Normal  Vibratory sensation:  Normal  Hot/Cold sensation: normal    Protective Sensation using Freeman-Yaron Monofilament:  10     Left Foot  Neurologic   Normal sensation    Light touch sensation:  Normal  Vibratory sensation:  Normal  Hot/cold sensation: normal    Protective Sensation using Vernon-Yaron Monofilament:  10     MUSCULOSKELETAL      Right Foot Musculoskeletal   Ecchymosis:  None  Tenderness comment:  Right ankle     MUSCLE STRENGTH     Right Foot Muscle Strength   Normal strength    Foot dorsiflexion:  5  Foot plantar flexion:  5  Foot inversion:  5  Foot eversion:  5     Left Foot Muscle Strength   Foot dorsiflexion:  5  Foot plantar flexion:  5  Foot inversion:  5  Foot eversion:  5     RANGE OF MOTION      Right Foot Range of Motion   Foot and ankle ROM within normal limits    Right ankle active dorsiflexion: Crepitus with limited range of motion.  ankle dorsiflexion pain    Right ankle active plantar flexion: Crepitus with limited range of motion.  plantar flexion pain       Left Foot Range of Motion   Foot and ankle ROM within normal limits       DERMATOLOGIC     Right Foot Dermatologic   Skin: skin intact    Nails: normal       Left Foot Dermatologic   Skin: skin intact    Nails: normal        RADIOLOGY:      XR Ankle 3+ View Right    Result Date: 3/10/2022  Narrative: IN-OFFICE IMAGING:  3 view, AP, MO, Lateral, right ankle Indication: Preop arthritis with ankle pain Findings: Significant degenerative changes seen throughout the ankle joint. Comparison: No comparison views available.  Previous x-ray transcription was reviewed.  These findings appear to be worsening compared to previous descriptions of right ankle.       ASSESSMENT/PLAN     Diagnoses and all orders for this visit:    1. Traumatic arthritis of ankle, right (Primary)  -     Ambulatory Referral to Orthopedic Surgery    2. Foot pain, right        Comprehensive lower extremity examination and evaluation was performed.    Discussed findings and treatment plan including risks, benefits, and treatment options with patient in detail. Patient agreed with treatment  plan.    Medications and allergies reviewed.  Reviewed available lab values along with other pertinent labs.  These were discussed with the patient.    The need for a referral to another physician was discussed with the patient.  They state understanding and agreement with this plan.  The patient is to referred to Dr. Gary Guzman for evaluation for possible right ankle surgery.    An After Visit Summary was printed and given to the patient at discharge, including (if requested) any available informative/educational handouts regarding diagnosis, treatment, or medications. All questions were answered to patient/family satisfaction. Should symptoms fail to improve or worsen they agree to call or return to clinic or to go to the Emergency Department. Discussed the importance of following up with any needed screening tests/labs/specialist appointments and any requested follow-up recommended by me today. Importance of maintaining follow-up discussed and patient accepts that missed appointments can delay diagnosis and potentially lead to worsening of conditions.    Return if symptoms worsen or fail to improve., or sooner if acute issues arise.    This document has been electronically signed by Scot Garcia DPM on March 10, 2022 12:26 EST

## 2022-03-30 RX ORDER — ESTRADIOL 0.05 MG/D
PATCH, EXTENDED RELEASE TRANSDERMAL
Qty: 8 PATCH | OUTPATIENT
Start: 2022-03-30

## 2022-03-30 RX ORDER — ESTRADIOL 0.05 MG/D
PATCH, EXTENDED RELEASE TRANSDERMAL
Qty: 8 PATCH | Refills: 0 | Status: SHIPPED | OUTPATIENT
Start: 2022-03-30 | End: 2022-04-11

## 2022-04-11 ENCOUNTER — OFFICE VISIT (OUTPATIENT)
Dept: OBSTETRICS AND GYNECOLOGY | Facility: CLINIC | Age: 62
End: 2022-04-11

## 2022-04-11 VITALS
SYSTOLIC BLOOD PRESSURE: 121 MMHG | WEIGHT: 170.6 LBS | BODY MASS INDEX: 28.42 KG/M2 | HEIGHT: 65 IN | DIASTOLIC BLOOD PRESSURE: 78 MMHG | HEART RATE: 66 BPM

## 2022-04-11 DIAGNOSIS — Z12.31 ENCOUNTER FOR SCREENING MAMMOGRAM FOR MALIGNANT NEOPLASM OF BREAST: ICD-10-CM

## 2022-04-11 DIAGNOSIS — Z01.419 WELL WOMAN EXAM: Primary | ICD-10-CM

## 2022-04-11 DIAGNOSIS — Z79.890 POSTMENOPAUSAL HRT (HORMONE REPLACEMENT THERAPY): ICD-10-CM

## 2022-04-11 PROCEDURE — G0123 SCREEN CERV/VAG THIN LAYER: HCPCS | Performed by: NURSE PRACTITIONER

## 2022-04-11 PROCEDURE — 99396 PREV VISIT EST AGE 40-64: CPT | Performed by: NURSE PRACTITIONER

## 2022-04-11 RX ORDER — ESTRADIOL 0.07 MG/D
1 FILM, EXTENDED RELEASE TRANSDERMAL 2 TIMES WEEKLY
Qty: 8 PATCH | Refills: 11 | Status: SHIPPED | OUTPATIENT
Start: 2022-04-11

## 2022-04-11 RX ORDER — ESTRADIOL 0.1 MG/G
1 CREAM VAGINAL 2 TIMES WEEKLY
Qty: 42.5 G | Refills: 5 | Status: SHIPPED | OUTPATIENT
Start: 2022-04-11 | End: 2023-04-11

## 2022-04-11 RX ORDER — ESTRADIOL 0.07 MG/D
1 FILM, EXTENDED RELEASE TRANSDERMAL 2 TIMES WEEKLY
Qty: 8 PATCH | Refills: 11 | Status: SHIPPED | OUTPATIENT
Start: 2022-04-11 | End: 2022-04-11 | Stop reason: SDUPTHER

## 2022-04-11 NOTE — PROGRESS NOTES
HPI:   61 y.o..Presents for well woman exam. Contraception or HRT: using HRT, s/p HYST, still has one or both ovaries, benign indications, Desires increase transdermal estrogen d/t noticing increased irritability and bad mood  Menses:   No vaginal bleeding  Pain:  None  Last pap normal, Hx VAIN 08  Patient reports that she is not currently experiencing any symptoms of urinary incontinence.      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 ENDOSCOPY;  Service: Gastroenterology;  Laterality: N/A;  DIVERTICULOSIS, COLON POLYP   • DILATION AND CURETTAGE, DIAGNOSTIC / THERAPEUTIC     • ENDOMETRIAL ABLATION     • ENDOSCOPY N/A 2021    Procedure: ESOPHAGOGASTRODUODENOSCOPY WITH BIOPSIES;  Surgeon: Dimitrios Reeves MD;  Location: [facility] ENDOSCOPY;  Service: Gastroenterology;  Laterality: N/A;  HIATAL HERNIA   • HYSTERECTOMY  2005    fibroid tumor, ovaries remain   • OTHER SURGICAL HISTORY      Fatty Tumor    • TONSILLECTOMY        Family History   Problem Relation Age of Onset   • Stroke Mother   "  • Heart disease Mother    • Cancer Mother    • Diabetes Mother    • Arthritis Mother    • Stroke Father    • Heart disease Father    • Other Father         Spine Problems    • Heart attack Father    • Heart disease Brother    • Diabetes Brother    • Cancer Maternal Grandmother    • Malig Hyperthermia Neg Hx         PCP: does manage PMHx and preventative labs    PHYSICAL EXAM: Chaperone present /78   Pulse 66   Ht 165.1 cm (65\")   Wt 77.4 kg (170 lb 9.6 oz)   Breastfeeding No   BMI 28.39 kg/m²   General- NAD, alert and oriented, appropriate  Psych- Normal mood, good memory  Neck- No masses, no thyroid enlargement  Lymphatic- No palpable neck, axillary, or groin nodes  CV- Regular rhythm, no murmurs  Resp- CTA to bases, no wheezes  Abdomen- Soft, non distended, non tender, no masses  Breast left-  Bilaterally symmetrical, no masses, non tender, no nipple discharge  Breast right- Bilaterally symmetrical, no masses, non tender, no nipple discharge  External genitalia- Normal female, no lesions  Urethra/meatus- Normal, no masses, non tender, no prolapse  Bladder- Normal, no masses, non tender, no prolapse  Vagina- Normal, atrophic, no lesions, no discharge, no prolapse  Cvx- Absent  Uterus- Absent  Adnexa- No mass, non tender  Anus/Rectum/Perineum- Not performed  Ext- No edema, no cyanosis    Skin- No lesions, no rashes, no acanthosis nigricans       ASSESSMENT and PLAN:    Diagnoses and all orders for this visit:    1. Well woman exam (Primary)  -     IGP,rfx Aptima HPV All Pth    2. Encounter for screening mammogram for malignant neoplasm of breast  -     Mammo Screening Digital Tomosynthesis Bilateral With CAD; Future    3. Postmenopausal HRT (hormone replacement therapy)  -     estradiol (Lyllana) 0.075 MG/24HR patch; Place 1 patch on the skin as directed by provider 2 (Two) Times a Week.  Dispense: 8 patch; Refill: 11  -     estradiol (ESTRACE) 0.1 MG/GM vaginal cream; Insert 1 g into the vagina 2 (Two) " Times a Week.  Dispense: 42.5 g; Refill: 5    Other orders  -     Discontinue: estradiol (Lyllana) 0.075 MG/24HR patch; Place 1 patch on the skin as directed by provider 2 (Two) Times a Week.  Dispense: 8 patch; Refill: 11      Preventative:   BREAST HEALTH- Monthly self breast exam importance and how to reviewed. MMG and/or MRI (prn) reviewed per society guidelines and her individual history. Screen: Updated today  CERVICAL CANCER Screening- Reviewed current ASCCP guidelines for screening w and wo cotest HPV, age specific.  Screen: Updated today  COLON CANCER Screening- Reviewed current medical society guidelines and options.  Screen:  Already up to date  Follow up PCP/Specialist PMHx and Labs  Myriad: Does not qualify.  HRT R/B/A/SE/E all options including non-FDA approved options discussed w pt.         HRT- I recommend the lowest dose possible, for the shortest period of time.  Risks HRT NLT breast CA (progestin), CVA, MI, LANDON.      She understands the importance of having any ordered tests to be performed in a timely fashion.  The risks of not performing them include, but are not limited to, advanced cancer stages, bone loss from osteoporosis and/or subsequent increase in morbidity and/or mortality.  She is encouraged to review her results online and/or contact or office if she has questions.     Follow Up:  Return if symptoms worsen or fail to improve.        Bre Toscano, APRN  04/11/2022

## 2022-04-12 ENCOUNTER — OFFICE VISIT (OUTPATIENT)
Dept: FAMILY MEDICINE CLINIC | Facility: CLINIC | Age: 62
End: 2022-04-12

## 2022-04-12 VITALS
OXYGEN SATURATION: 100 % | HEART RATE: 64 BPM | TEMPERATURE: 96.7 F | SYSTOLIC BLOOD PRESSURE: 108 MMHG | HEIGHT: 65 IN | DIASTOLIC BLOOD PRESSURE: 78 MMHG | WEIGHT: 170 LBS | BODY MASS INDEX: 28.32 KG/M2

## 2022-04-12 DIAGNOSIS — Z13.820 ENCOUNTER FOR OSTEOPOROSIS SCREENING IN ASYMPTOMATIC POSTMENOPAUSAL PATIENT: ICD-10-CM

## 2022-04-12 DIAGNOSIS — E55.9 VITAMIN D DEFICIENCY: ICD-10-CM

## 2022-04-12 DIAGNOSIS — E78.41 ELEVATED LIPOPROTEIN(A): Primary | ICD-10-CM

## 2022-04-12 DIAGNOSIS — Z78.0 ENCOUNTER FOR OSTEOPOROSIS SCREENING IN ASYMPTOMATIC POSTMENOPAUSAL PATIENT: ICD-10-CM

## 2022-04-12 DIAGNOSIS — F98.8 ATTENTION DEFICIT DISORDER, UNSPECIFIED HYPERACTIVITY PRESENCE: ICD-10-CM

## 2022-04-12 DIAGNOSIS — R73.09 ELEVATED GLUCOSE: ICD-10-CM

## 2022-04-12 DIAGNOSIS — R63.5 WEIGHT GAIN: ICD-10-CM

## 2022-04-12 DIAGNOSIS — Z11.59 ENCOUNTER FOR HEPATITIS C SCREENING TEST FOR LOW RISK PATIENT: ICD-10-CM

## 2022-04-12 DIAGNOSIS — K21.9 GASTROESOPHAGEAL REFLUX DISEASE WITHOUT ESOPHAGITIS: ICD-10-CM

## 2022-04-12 LAB
25(OH)D3 SERPL-MCNC: 35.7 NG/ML (ref 30–100)
ALBUMIN SERPL-MCNC: 4 G/DL (ref 3.5–5.2)
ALBUMIN/GLOB SERPL: 1.9 G/DL
ALP SERPL-CCNC: 40 U/L (ref 39–117)
ALT SERPL W P-5'-P-CCNC: 17 U/L (ref 1–33)
ANION GAP SERPL CALCULATED.3IONS-SCNC: 9.1 MMOL/L (ref 5–15)
AST SERPL-CCNC: 20 U/L (ref 1–32)
BASOPHILS # BLD AUTO: 0.03 10*3/MM3 (ref 0–0.2)
BASOPHILS NFR BLD AUTO: 0.5 % (ref 0–1.5)
BILIRUB SERPL-MCNC: 0.9 MG/DL (ref 0–1.2)
BUN SERPL-MCNC: 15 MG/DL (ref 8–23)
BUN/CREAT SERPL: 24.6 (ref 7–25)
CALCIUM SPEC-SCNC: 9.1 MG/DL (ref 8.6–10.5)
CHLORIDE SERPL-SCNC: 108 MMOL/L (ref 98–107)
CHOLEST SERPL-MCNC: 278 MG/DL (ref 0–200)
CO2 SERPL-SCNC: 23.9 MMOL/L (ref 22–29)
CREAT SERPL-MCNC: 0.61 MG/DL (ref 0.57–1)
DEPRECATED RDW RBC AUTO: 43.5 FL (ref 37–54)
EGFRCR SERPLBLD CKD-EPI 2021: 100.6 ML/MIN/1.73
EOSINOPHIL # BLD AUTO: 0.07 10*3/MM3 (ref 0–0.4)
EOSINOPHIL NFR BLD AUTO: 1.1 % (ref 0.3–6.2)
ERYTHROCYTE [DISTWIDTH] IN BLOOD BY AUTOMATED COUNT: 12.5 % (ref 12.3–15.4)
GLOBULIN UR ELPH-MCNC: 2.1 GM/DL
GLUCOSE SERPL-MCNC: 90 MG/DL (ref 65–99)
HBA1C MFR BLD: 5.1 % (ref 4.8–5.6)
HCT VFR BLD AUTO: 39.7 % (ref 34–46.6)
HCV AB SER DONR QL: NORMAL
HDLC SERPL-MCNC: 77 MG/DL (ref 40–60)
HGB BLD-MCNC: 13.2 G/DL (ref 12–15.9)
IMM GRANULOCYTES # BLD AUTO: 0.01 10*3/MM3 (ref 0–0.05)
IMM GRANULOCYTES NFR BLD AUTO: 0.2 % (ref 0–0.5)
LDLC SERPL CALC-MCNC: 196 MG/DL (ref 0–100)
LDLC/HDLC SERPL: 2.5 {RATIO}
LYMPHOCYTES # BLD AUTO: 2.07 10*3/MM3 (ref 0.7–3.1)
LYMPHOCYTES NFR BLD AUTO: 33.9 % (ref 19.6–45.3)
MCH RBC QN AUTO: 31.7 PG (ref 26.6–33)
MCHC RBC AUTO-ENTMCNC: 33.2 G/DL (ref 31.5–35.7)
MCV RBC AUTO: 95.2 FL (ref 79–97)
MONOCYTES # BLD AUTO: 0.39 10*3/MM3 (ref 0.1–0.9)
MONOCYTES NFR BLD AUTO: 6.4 % (ref 5–12)
NEUTROPHILS NFR BLD AUTO: 3.53 10*3/MM3 (ref 1.7–7)
NEUTROPHILS NFR BLD AUTO: 57.9 % (ref 42.7–76)
NRBC BLD AUTO-RTO: 0 /100 WBC (ref 0–0.2)
PLATELET # BLD AUTO: 231 10*3/MM3 (ref 140–450)
PMV BLD AUTO: 11.6 FL (ref 6–12)
POTASSIUM SERPL-SCNC: 5.1 MMOL/L (ref 3.5–5.2)
PROT SERPL-MCNC: 6.1 G/DL (ref 6–8.5)
RBC # BLD AUTO: 4.17 10*6/MM3 (ref 3.77–5.28)
SODIUM SERPL-SCNC: 141 MMOL/L (ref 136–145)
T4 FREE SERPL-MCNC: 1.14 NG/DL (ref 0.93–1.7)
TRIGL SERPL-MCNC: 41 MG/DL (ref 0–150)
TSH SERPL DL<=0.05 MIU/L-ACNC: 2.32 UIU/ML (ref 0.27–4.2)
VLDLC SERPL-MCNC: 5 MG/DL (ref 5–40)
WBC NRBC COR # BLD: 6.1 10*3/MM3 (ref 3.4–10.8)

## 2022-04-12 PROCEDURE — 84439 ASSAY OF FREE THYROXINE: CPT | Performed by: NURSE PRACTITIONER

## 2022-04-12 PROCEDURE — 86803 HEPATITIS C AB TEST: CPT | Performed by: NURSE PRACTITIONER

## 2022-04-12 PROCEDURE — 80061 LIPID PANEL: CPT | Performed by: NURSE PRACTITIONER

## 2022-04-12 PROCEDURE — 99214 OFFICE O/P EST MOD 30 MIN: CPT | Performed by: NURSE PRACTITIONER

## 2022-04-12 PROCEDURE — 80050 GENERAL HEALTH PANEL: CPT | Performed by: NURSE PRACTITIONER

## 2022-04-12 PROCEDURE — 82306 VITAMIN D 25 HYDROXY: CPT | Performed by: NURSE PRACTITIONER

## 2022-04-12 PROCEDURE — 83036 HEMOGLOBIN GLYCOSYLATED A1C: CPT | Performed by: NURSE PRACTITIONER

## 2022-04-12 RX ORDER — FAMOTIDINE 20 MG/1
20 TABLET, FILM COATED ORAL DAILY
COMMUNITY
Start: 2021-10-15 | End: 2022-04-12 | Stop reason: SDUPTHER

## 2022-04-12 RX ORDER — ESTRADIOL 0.05 MG/D
1 FILM, EXTENDED RELEASE TRANSDERMAL 2 TIMES WEEKLY
COMMUNITY
Start: 2022-03-30 | End: 2022-04-12 | Stop reason: SDUPTHER

## 2022-04-12 NOTE — PROGRESS NOTES
"Chief Complaint  Hyperlipidemia, GERD    Subjective          Aleyda Lisa Cortés presents to [facility]  History of Present Illness  Presents today for a follow-up on hyperlipidemia and GERD.  Reflux is well controlled on the omeprazole 40 mg daily.  EGD showed she had some gastritis.  Symptoms had improved proved so she is stopped taking her Pepcid only takes it as needed.  As long as she takes the omeprazole no reflux symptoms.      She has had a DEXA scan in the past.  Does not recall what her previous results were.  Is been several years ago.    LDL and HDL are elevated.  Triglycerides are within normal limits.  She reports she has gained some weight over the past year or 2 since the pandemic.    Patient states she has a history of ADHD.  She was prescribed Ritalin when she was a teenager.  She states she was allergic and was switched to Dexedrine.  She is also previously taking Strattera which has not helped.  She has previously been on Adderall.  She has been off Adderall medication in the past 1 to 2 years.  She states she has difficulty time focusing and paying attention.  States she would like to restart Adderall medication again.    Objective   Vital Signs:   /78   Pulse 64   Temp 96.7 °F (35.9 °C)   Ht 165.1 cm (65\")   Wt 77.1 kg (170 lb)   SpO2 100%   BMI 28.29 kg/m²            Physical Exam  Vitals reviewed.   Constitutional:       Appearance: Normal appearance. She is overweight.   HENT:      Head: Normocephalic and atraumatic.      Right Ear: External ear normal.      Left Ear: External ear normal.      Mouth/Throat:      Pharynx: No oropharyngeal exudate.   Eyes:      Conjunctiva/sclera: Conjunctivae normal.      Pupils: Pupils are equal, round, and reactive to light.   Cardiovascular:      Rate and Rhythm: Normal rate and regular rhythm.      Heart sounds: No murmur heard.    No friction rub. No gallop.   Pulmonary:      Effort: Pulmonary effort is " normal.      Breath sounds: Normal breath sounds. No wheezing or rhonchi.   Abdominal:      General: Bowel sounds are normal. There is no distension.      Palpations: Abdomen is soft.      Tenderness: There is no abdominal tenderness.   Skin:     General: Skin is warm and dry.   Neurological:      Mental Status: She is alert and oriented to person, place, and time.   Psychiatric:         Mood and Affect: Mood and affect normal.         Behavior: Behavior normal.         Thought Content: Thought content normal.         Judgment: Judgment normal.        Result Review :     Common labs    Common Labsle 5/23/21 5/23/21 6/1/21 2039 2039    Glucose  102 (A)    BUN  15    Creatinine  0.75    Sodium  138    Potassium  4.1    Chloride  101    Calcium  9.7    Albumin  4.2    Total Bilirubin  0.98    Alkaline Phosphatase  61    AST (SGOT)  18    ALT (SGPT)  13    WBC 7.13     Hemoglobin 13.8     Hematocrit 40.7     Platelets 239     Total Cholesterol   263 (A)   Triglycerides   47   HDL Cholesterol   90 (A)   LDL Cholesterol    164 (A)   (A) Abnormal value       Comments are available for some flowsheets but are not being displayed.                     Assessment and Plan    Diagnoses and all orders for this visit:    1. Elevated lipoprotein(a) (Primary)  -     Lipid Panel    2. Encounter for hepatitis C screening test for low risk patient  -     Hepatitis C Antibody    3. Weight gain  -     TSH+Free T4    4. Gastroesophageal reflux disease without esophagitis  -     CBC & Differential  -     Comprehensive Metabolic Panel    5. Elevated glucose  -     Hemoglobin A1c    6. Vitamin D deficiency  -     Vitamin D 25 Hydroxy    7. Encounter for osteoporosis screening in asymptomatic postmenopausal patient  -     DEXA Bone Density Axial; Future    8. Attention deficit disorder, unspecified hyperactivity presence  Comments:  Consult psychiatry for further evaluation and treatment for history of ADD.  Orders:  -     Ambulatory  Referral to Psychiatry    Discussed low-cholesterol diet.  Will check lipid panel again today.  Check TSH for weight gain over the past 1 to 2 years.  She also had elevated blood sugar.  Check hemoglobin A1c.  DEXA scan for osteoporosis screening.    Follow Up   Return in about 6 months (around 10/12/2022) for Next scheduled follow up.  Patient was given instructions and counseling regarding her condition or for health maintenance advice. Please see specific information pulled into the AVS if appropriate.

## 2022-04-13 DIAGNOSIS — E78.41 ELEVATED LIPOPROTEIN(A): Primary | ICD-10-CM

## 2022-04-13 RX ORDER — ATORVASTATIN CALCIUM 10 MG/1
10 TABLET, FILM COATED ORAL DAILY
Qty: 90 TABLET | Refills: 1 | Status: SHIPPED | OUTPATIENT
Start: 2022-04-13 | End: 2022-10-11

## 2022-04-18 LAB
CONV .: NORMAL
CYTOLOGIST CVX/VAG CYTO: NORMAL
CYTOLOGY CVX/VAG DOC CYTO: NORMAL
CYTOLOGY CVX/VAG DOC THIN PREP: NORMAL
DX ICD CODE: NORMAL
HIV 1 & 2 AB SER-IMP: NORMAL
OTHER STN SPEC: NORMAL
STAT OF ADQ CVX/VAG CYTO-IMP: NORMAL

## 2022-04-20 ENCOUNTER — HOSPITAL ENCOUNTER (OUTPATIENT)
Dept: MAMMOGRAPHY | Facility: HOSPITAL | Age: 62
Discharge: HOME | End: 2022-04-20
Admitting: NURSE PRACTITIONER

## 2022-04-20 DIAGNOSIS — Z12.31 ENCOUNTER FOR SCREENING MAMMOGRAM FOR MALIGNANT NEOPLASM OF BREAST: ICD-10-CM

## 2022-04-20 PROCEDURE — 77067 SCR MAMMO BI INCL CAD: CPT

## 2022-04-20 PROCEDURE — 77063 BREAST TOMOSYNTHESIS BI: CPT

## 2022-05-10 ENCOUNTER — OFFICE VISIT (OUTPATIENT)
Dept: PSYCHIATRY | Facility: CLINIC | Age: 62
End: 2022-05-10

## 2022-05-10 VITALS
DIASTOLIC BLOOD PRESSURE: 76 MMHG | HEIGHT: 67 IN | BODY MASS INDEX: 27 KG/M2 | WEIGHT: 172 LBS | SYSTOLIC BLOOD PRESSURE: 137 MMHG

## 2022-05-10 DIAGNOSIS — F90.0 ADHD (ATTENTION DEFICIT HYPERACTIVITY DISORDER), INATTENTIVE TYPE: Primary | ICD-10-CM

## 2022-05-10 PROCEDURE — 90792 PSYCH DIAG EVAL W/MED SRVCS: CPT | Performed by: NURSE PRACTITIONER

## 2022-05-10 NOTE — PROGRESS NOTES
Subjective   Aleyda Cortés is a 61 y.o. female who presents today for initial evaluation   This provider is located at [address] [place] [address], [address] in [place], [place]. In-person visit consisting of the patient and I only. The patient is accepting of and agreeable to this appointment.       Chief Complaint:  ADHD    History of Present Illness: ADHD:   Elementary School- Diagnosed around the 1st grade.   Grades- poor grades   Special classes or failures- denies   Behavioral issues- endorses- expelled from the first grade   Referral for ADHD testing- denies  FHX- Son  Presently:   Problems with attention to detail- y  Problems with sustained attention- y  Problems listening when spoken to directly- y  Failure to finish tasks- y  Easily distracted- y  Forgetting things- y  Losing things- y  Hard to organize- y      Psychiatric Review of Systems: Patient denies any current or previous depression, anxiety, hallucinations/delusions, paranoia, manic symptoms or PTSD.     PHQ-9 Depression Screening  PHQ-9 Total Score:      Little interest or pleasure in doing things?     Feeling down, depressed, or hopeless?     Trouble falling or staying asleep, or sleeping too much?     Feeling tired or having little energy?     Poor appetite or overeating?     Feeling bad about yourself - or that you are a failure or have let yourself or your family down?     Trouble concentrating on things, such as reading the newspaper or watching television?     Moving or speaking so slowly that other people could have noticed? Or the opposite - being so fidgety or restless that you have been moving around a lot more than usual?     Thoughts that you would be better off dead, or of hurting yourself in some way?     PHQ-9 Total Score       JESSICA-7  Feeling nervous, anxious or on edge: Not at all  Not being able to stop or control worrying: Not at all  Worrying too much about different things: Not at all  Trouble Relaxing: Not at  all  Being so restless that it is hard to sit still: Several days  Feeling afraid as if something awful might happen: Not at all  Becoming easily annoyed or irritable: Not at all  JESSICA 7 Total Score: 1  If you checked any problems, how difficult have these problems made it for you to do your work, take care of things at home, or get along with other people: Not difficult at all      Past Surgical History:  Past Surgical History:   Procedure Laterality Date   • BLADDER SURGERY      catherized    •  SECTION     • COLONOSCOPY     • COLONOSCOPY N/A 2021    Procedure: COLONOSCOPY WITH ORISE INJECTION,  POLYPECTOMY HOT SNARE, 4 CLIPS APPLIED;  Surgeon: Dimitrios Reeves MD;  Location: [facility]COPY;  Service: Gastroenterology;  Laterality: N/A;  DIVERTICULOSIS, COLON POLYP   • DILATION AND CURETTAGE, DIAGNOSTIC / THERAPEUTIC     • ENDOMETRIAL ABLATION     • ENDOSCOPY N/A 2021    Procedure: ESOPHAGOGASTRODUODENOSCOPY WITH BIOPSIES;  Surgeon: Dimitrios Reeves MD;  Location: [facility] ENDOSCOPY;  Service: Gastroenterology;  Laterality: N/A;  HIATAL HERNIA   • HYSTERECTOMY  2005    fibroid tumor, ovaries remain   • OTHER SURGICAL HISTORY      Fatty Tumor    • TONSILLECTOMY         Problem List:  Patient Active Problem List   Diagnosis   • Hyperlipemia   • Low back pain   • Cervical radiculopathy   • Arthritis   • Tinnitus   • TMJ disorder involving articular disc abnormality   • Vitamin D deficiency   • Gastroesophageal reflux disease without esophagitis   • History of colon polyps   • Gastritis   • Hiatal hernia   • Diverticulosis       Allergy:   Allergies   Allergen Reactions   • Propoxyphene Mental Status Change   • Epinephrine Other (See Comments)     shaking   • Ritalin [Methylphenidate] Headache   • Sulfa Antibiotics Rash        Discontinued Medications:  There are no discontinued medications.    Current Medications:   Current Outpatient Medications   Medication Sig Dispense Refill    • ammonium lactate (AMLACTIN) 12 % cream ammonium lactate 12 % topical cream apply to affected area(s) by topical route QD after showers   Active     • ascorbic acid (VITAMIN C) 500 MG capsule controlled-release CR capsule Take 500 mg by mouth Daily.     • atorvastatin (LIPITOR) 10 MG tablet Take 1 tablet by mouth Daily. 90 tablet 1   • Cholecalciferol 25 MCG (1000 UT) capsule Take 1,000 Units by mouth Daily.     • estradiol (ESTRACE) 0.1 MG/GM vaginal cream Insert 1 g into the vagina 2 (Two) Times a Week. 42.5 g 5   • estradiol (Lyllana) 0.075 MG/24HR patch Place 1 patch on the skin as directed by provider 2 (Two) Times a Week. 8 patch 11   • famotidine (Pepcid) 20 MG tablet Take 1 tablet by mouth Every Night. 90 tablet 1   • Fexofenadine HCl (MUCINEX ALLERGY PO) Take  by mouth.     • latanoprost (XALATAN) 0.005 % ophthalmic solution INSTILL 1 DROP IN BOTH EYES AT BEDTIME     • meloxicam (MOBIC) 15 MG tablet Take 1 tablet by mouth Daily. 30 tablet 1   • omeprazole (priLOSEC) 40 MG capsule Take 1 capsule by mouth Daily. 30 capsule 2     No current facility-administered medications for this visit.       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   • Cervical cancer screening 02/2021    Normal per patient    • Cervical disc disorder 05/15/2017   • Cervical radiculopathy 05/15/2017   • Cervicalgia    • Depression    • Head injury    • Hearing loss    • Hyperlipemia    • Low back pain 05/15/2017   • Screening for breast cancer 02/2021    normal per patient    • Tinnitus    • TMJ disorder involving articular disc abnormality    • VAIN II (vaginal intraepithelial neoplasia grade II) 2008   • Vitamin D deficiency 06/01/2021    check vitamin d level        Past Psychiatric History:  Began Treatment: Childhood  Diagnoses:  ADHD  Psychiatrist: Denies  Therapist: Dr. Montoya  Admission History: Denies  Medication Trials: Dexedrine, Strattera, Adderall  Self Harm: Denies  Suicide Attempts: Denies    Substance Abuse History:   Types: Denies  Withdrawal Symptoms: Not applicable  Longest Period Sober: Not applicable  AA: Not applicable    Social History:  Martial Status:  x1  Employed: Part-time bartending  Kids: 2 children  House: Lives at home by self   History: Denies    Social History     Socioeconomic History   • Marital status:    • Number of children: 2   Tobacco Use   • Smoking status: Former Smoker     Packs/day: 1.00     Years: 30.00     Pack years: 30.00     Quit date:      Years since quittin.3   • Smokeless tobacco: Never Used   Vaping Use   • Vaping Use: Never used   Substance and Sexual Activity   • Alcohol use: Yes     Comment: 5 drinks a week    • Drug use: Never   • Sexual activity: Yes     Partners: Male     Birth control/protection: Surgical       Family History:   Suicide Attempts: Denies  Suicide Completions: Denies      Family History   Problem Relation Age of Onset   • Stroke Mother    • Heart disease Mother    • Cancer Mother    • Diabetes Mother    • Arthritis Mother    • Stroke Father    • Heart disease Father    • Other Father         Spine Problems    • Heart attack Father    • Suicide Attempts Brother    • Self-Injurious Behavior  Brother    • Depression Brother    • Bipolar disorder Brother    • Alcohol abuse Brother    • Heart disease Brother    • Diabetes Brother    • Cancer Maternal Grandmother    • Depression Son    • ADD / ADHD Son    • Malig Hyperthermia Neg Hx        Developmental History:   Born: [place]  Siblings: 3 older brothers  Childhood: Denies childhood abuse  High School: Graduate  College: Graduate    Access to Firearms: Denies    Mental Status Exam:     Appearance: good eye contact, normal street clothes, groomed, sitting in chair   Behavior: pleasant and  "cooperative  Motor: no abnormal  Speech: normal rhythm, rate, volume, tone, not hyperverbal, not pressured, normal prosidy  Mood: \" Okay\"  Affect: euthymic  Thought Content: negative suicidal ideations, negative homicidal ideations, negative obsessions  Perceptions: negative auditory hallucinations, negative visual hallucinations, negative delusions, negative paranoia  Thought Process: goal directed, linear  Insight/Judgement: fair/fair  Cognition: grossly intact  Attention: intact  Orientation: person, place, time and situation  Memory: intact    Review of Systems:     Constitutional: Denies fatigue, night sweats  Eyes: Denies double vision, blurred vision  HENT: Denies vertigo, recent head injury  Cardiovascular: Denies chest pain, irregular heartbeats  Respiratory: Denies productive cough, shortness of breath  Gastrointestinal: Denies nausea, vomiting  Genitourinary: Denies dysuria, urinary retention  Integument: Denies hair growth change, new skin lesions  Neurologic: Denies altered mental status, seizures  Musculoskeletal: Denies joint swelling, limitation of motion  Endocrine: Denies cold intolerance, heat intolerance  Psychiatric: Admits inattention. Denies depression, anxiety, adal, post-traumatic stress disorder, obsessive compulsive disorder, psychosis.   Allergic-immunologic: Denies frequent illnesses      Vital Signs:   /76   Ht 170.2 cm (67\")   Wt 78 kg (172 lb)   BMI 26.94 kg/m²      Lab Results:   Office Visit on 04/12/2022   Component Date Value Ref Range Status   • Glucose 04/12/2022 90  65 - 99 mg/dL Final   • BUN 04/12/2022 15  8 - 23 mg/dL Final   • Creatinine 04/12/2022 0.61  0.57 - 1.00 mg/dL Final   • Sodium 04/12/2022 141  136 - 145 mmol/L Final   • Potassium 04/12/2022 5.1  3.5 - 5.2 mmol/L Final   • Chloride 04/12/2022 108 (A) 98 - 107 mmol/L Final   • CO2 04/12/2022 23.9  22.0 - 29.0 mmol/L Final   • Calcium 04/12/2022 9.1  8.6 - 10.5 mg/dL Final   • Total Protein 04/12/2022 6.1  " 6.0 - 8.5 g/dL Final   • Albumin 04/12/2022 4.00  3.50 - 5.20 g/dL Final   • ALT (SGPT) 04/12/2022 17  1 - 33 U/L Final   • AST (SGOT) 04/12/2022 20  1 - 32 U/L Final   • Alkaline Phosphatase 04/12/2022 40  39 - 117 U/L Final   • Total Bilirubin 04/12/2022 0.9  0.0 - 1.2 mg/dL Final   • Globulin 04/12/2022 2.1  gm/dL Final   • A/G Ratio 04/12/2022 1.9  g/dL Final   • BUN/Creatinine Ratio 04/12/2022 24.6  7.0 - 25.0 Final   • Anion Gap 04/12/2022 9.1  5.0 - 15.0 mmol/L Final   • eGFR 04/12/2022 100.6  >60.0 mL/min/1.73 Final    National Kidney Foundation and American Society of Nephrology (ASN) Task Force recommended calculation based on the Chronic Kidney Disease Epidemiology Collaboration (CKD-EPI) equation refit without adjustment for race.   • TSH 04/12/2022 2.320  0.270 - 4.200 uIU/mL Final   • Free T4 04/12/2022 1.14  0.93 - 1.70 ng/dL Final    T4 results may be falsely increased if patient taking Biotin.   • Hepatitis C Ab 04/12/2022 Non-Reactive  Non-Reactive Final   • Total Cholesterol 04/12/2022 278 (A) 0 - 200 mg/dL Final   • Triglycerides 04/12/2022 41  0 - 150 mg/dL Final   • HDL Cholesterol 04/12/2022 77 (A) 40 - 60 mg/dL Final   • LDL Cholesterol  04/12/2022 196 (A) 0 - 100 mg/dL Final   • VLDL Cholesterol 04/12/2022 5  5 - 40 mg/dL Final   • LDL/HDL Ratio 04/12/2022 2.50   Final   • Hemoglobin A1C 04/12/2022 5.10  4.80 - 5.60 % Final   • 25 Hydroxy, Vitamin D 04/12/2022 35.7  30.0 - 100.0 ng/ml Final   • WBC 04/12/2022 6.10  3.40 - 10.80 10*3/mm3 Final   • RBC 04/12/2022 4.17  3.77 - 5.28 10*6/mm3 Final   • Hemoglobin 04/12/2022 13.2  12.0 - 15.9 g/dL Final   • Hematocrit 04/12/2022 39.7  34.0 - 46.6 % Final   • MCV 04/12/2022 95.2  79.0 - 97.0 fL Final   • MCH 04/12/2022 31.7  26.6 - 33.0 pg Final   • MCHC 04/12/2022 33.2  31.5 - 35.7 g/dL Final   • RDW 04/12/2022 12.5  12.3 - 15.4 % Final   • RDW-SD 04/12/2022 43.5  37.0 - 54.0 fl Final   • MPV 04/12/2022 11.6  6.0 - 12.0 fL Final   • Platelets  04/12/2022 231  140 - 450 10*3/mm3 Final   • Neutrophil % 04/12/2022 57.9  42.7 - 76.0 % Final   • Lymphocyte % 04/12/2022 33.9  19.6 - 45.3 % Final   • Monocyte % 04/12/2022 6.4  5.0 - 12.0 % Final   • Eosinophil % 04/12/2022 1.1  0.3 - 6.2 % Final   • Basophil % 04/12/2022 0.5  0.0 - 1.5 % Final   • Immature Grans % 04/12/2022 0.2  0.0 - 0.5 % Final   • Neutrophils, Absolute 04/12/2022 3.53  1.70 - 7.00 10*3/mm3 Final   • Lymphocytes, Absolute 04/12/2022 2.07  0.70 - 3.10 10*3/mm3 Final   • Monocytes, Absolute 04/12/2022 0.39  0.10 - 0.90 10*3/mm3 Final   • Eosinophils, Absolute 04/12/2022 0.07  0.00 - 0.40 10*3/mm3 Final   • Basophils, Absolute 04/12/2022 0.03  0.00 - 0.20 10*3/mm3 Final   • Immature Grans, Absolute 04/12/2022 0.01  0.00 - 0.05 10*3/mm3 Final   • nRBC 04/12/2022 0.0  0.0 - 0.2 /100 WBC Final   Office Visit on 04/11/2022   Component Date Value Ref Range Status   • Diagnosis 04/11/2022 Comment   Final    NEGATIVE FOR INTRAEPITHELIAL LESION OR MALIGNANCY.   • Specimen adequacy: 04/11/2022 Comment   Final    Satisfactory for evaluation.   • Clinician Provided ICD-10: 04/11/2022 Comment   Final    Z01.419   • Performed by: 04/11/2022 Comment   Final    Tracy Hannah, Cytotechnologist (ASCP)   • . 04/11/2022 .   Final   • Note: 04/11/2022 Comment   Final    The Pap smear is a screening test designed to aid in the detection of  premalignant and malignant conditions of the uterine cervix.  It is not a  diagnostic procedure and should not be used as the sole means of detecting  cervical cancer.  Both false-positive and false-negative reports do occur.   • Method: 04/11/2022 Comment   Final    This liquid based ThinPrep(R) pap test was screened with the  use of an image guided system.   • Conv .conv 04/11/2022 Comment   Final    The HPV DNA reflex criteria were not met with this specimen result  therefore, no HPV testing was performed.   Admission on 12/06/2021, Discharged on 12/06/2021    Component Date Value Ref Range Status   • Case Report 12/06/2021    Final                    Value:Surgical Pathology Report                         Case: [id number]                                  Authorizing Provider:  Dimitrios Reeves MD  Collected:           12/06/2021 02:29 PM          Ordering Location:     [facility] Received:            12/07/2021 04:59 AM                                 SUITES                                                                       Pathologist:           Te Posada MD                                                          Specimens:   1) - Large Intestine, Right / Ascending Colon, ASCENDING COLON POLYP HOT SNARE                      2) - Gastric, Antrum, ANTRUM BX                                                           • Clinical Information 12/06/2021    Final    This result contains rich text formatting which cannot be displayed here.   • Final Diagnosis 12/06/2021    Final                    Value:This result contains rich text formatting which cannot be displayed here.   • Gross Description 12/06/2021    Final                    Value:This result contains rich text formatting which cannot be displayed here.   • Microscopic Description 12/06/2021    Final    This result contains rich text formatting which cannot be displayed here.       EKG Results:  No orders to display       Imaging Results:  Mammo Screening Digital Tomosynthesis Bilateral With CAD    Result Date: 4/20/2022   Benign mammogram. Suggest routine mammographic screening.  RECOMMENDATION(S):  ROUTINE MAMMOGRAM AND CLINICAL EVALUATION IN 12 MONTHS.   BIRADS:  DIAGNOSTIC CATEGORY 2--BENIGN FINDING   BREAST COMPOSITION: Scattered areas fibroglandular density.  PLEASE NOTE:  A NORMAL MAMMOGRAM DOES NOT EXCLUDE THE POSSIBILITY OF BREAST CANCER. ANY CLINICALLY SUSPICIOUS PALPABLE LUMP SHOULD BE BIOPSIED.      AQUILES JARAMILLO MD       Electronically Signed and Approved By: AQUILES LECHUGA  MD ELLA on 4/20/2022 at 13:45                 [unfilled]  Diagnoses and all orders for this visit:    1. ADHD (attention deficit hyperactivity disorder), inattentive type (Primary)      Patient presents with symptoms of inattention.  Was previously diagnosed with ADHD and last treated for ADHD through her PCP Dr. Baumann up until November 2020.  Denies any cardiac history or substance abuse history.  We will send referral for neuropsychological testing to confirm ADHD, but patient declines testing at this time. Will follow up as needed.     Visit Diagnoses:    ICD-10-CM ICD-9-CM   1. ADHD (attention deficit hyperactivity disorder), inattentive type  F90.0 314.00       PLAN:  1. Safety: No acute safety concerns.   2. Therapy: Declines  3. Risk Assessment: Risk of self-harm acutely is low.  Protective factors include no family history, denies access to guns/weapons, no present SI, no history of suicide attempts or self-harm in the past, minimal AODA, healthcare seeking, future orientation, willingness to engage in care.  Risk of self-harm chronically is also low, but could be further elevated in the event of treatment noncompliance and/or AODA.  4. Medications: No medication orders  5. Labs/studies: No labs/studies ordered at this time  6. Follow-up: As needed    Patient screened positive for depression based on a PHQ-9 score of 0 on 5/10/2022. Follow-up recommendations include: Suicide Risk Assessment performed.         TREATMENT PLAN/GOALS: Continue supportive psychotherapy efforts and medications as indicated. Treatment and medication options discussed during today's visit. Patient ackowledged and verbally consented to continue with current treatment plan and was educated on the importance of compliance with treatment and follow-up appointments.      MEDICATION ISSUES:  ROXY reviewed as expected.  Discussed medication options and treatment plan of prescribed medication as well as the risks,  benefits, and side effects including potential falls, possible impaired driving and metabolic adversities among others. Patient is agreeable to call the office with any worsening of symptoms or onset of side effects. Patient is agreeable to call 911 or go to the nearest ER should he/she begin having SI/HI. No medication side effects or related complaints today.     MEDS ORDERED DURING VISIT:  No orders of the defined types were placed in this encounter.      Follow up as needed       This document has been electronically signed by JYOTI Garcia  May 10, 2022 13:41 EDT      Part of this note may be an electronic transcription/translation of spoken language to printed text using the Dragon Dictation System.

## 2022-07-12 ENCOUNTER — HOSPITAL ENCOUNTER (OUTPATIENT)
Dept: BONE DENSITY | Facility: HOSPITAL | Age: 62
Discharge: HOME | End: 2022-07-12
Admitting: NURSE PRACTITIONER

## 2022-07-12 DIAGNOSIS — Z78.0 ENCOUNTER FOR OSTEOPOROSIS SCREENING IN ASYMPTOMATIC POSTMENOPAUSAL PATIENT: ICD-10-CM

## 2022-07-12 DIAGNOSIS — Z13.820 ENCOUNTER FOR OSTEOPOROSIS SCREENING IN ASYMPTOMATIC POSTMENOPAUSAL PATIENT: ICD-10-CM

## 2022-07-12 PROCEDURE — 77080 DXA BONE DENSITY AXIAL: CPT

## 2022-10-11 DIAGNOSIS — E78.41 ELEVATED LIPOPROTEIN(A): ICD-10-CM

## 2022-10-11 RX ORDER — ATORVASTATIN CALCIUM 10 MG/1
10 TABLET, FILM COATED ORAL DAILY
Qty: 90 TABLET | Refills: 0 | Status: SHIPPED | OUTPATIENT
Start: 2022-10-11

## 2023-01-07 DIAGNOSIS — E78.41 ELEVATED LIPOPROTEIN(A): ICD-10-CM

## 2023-01-09 RX ORDER — ATORVASTATIN CALCIUM 10 MG/1
10 TABLET, FILM COATED ORAL DAILY
Qty: 90 TABLET | Refills: 0 | OUTPATIENT
Start: 2023-01-09

## 2023-01-27 ENCOUNTER — CLINICAL SUPPORT (OUTPATIENT)
Dept: GASTROENTEROLOGY | Facility: CLINIC | Age: 63
End: 2023-01-27

## 2023-01-27 ENCOUNTER — PREP FOR SURGERY (OUTPATIENT)
Dept: OTHER | Facility: HOSPITAL | Age: 63
End: 2023-01-27
Payer: COMMERCIAL

## 2023-01-27 DIAGNOSIS — Z86.010 HISTORY OF COLONIC POLYPS: Primary | ICD-10-CM

## 2023-01-27 RX ORDER — SODIUM PICOSULFATE, MAGNESIUM OXIDE, AND ANHYDROUS CITRIC ACID 10; 3.5; 12 MG/160ML; G/160ML; G/160ML
160 LIQUID ORAL TAKE AS DIRECTED
Qty: 320 ML | Refills: 0 | Status: SHIPPED | OUTPATIENT
Start: 2023-01-27 | End: 2023-04-04 | Stop reason: SDUPTHER

## 2023-01-27 NOTE — PROGRESS NOTES
Aleyda Cortés  REASON FOR CALL: SCREENING CALL, LAST COLON 2021, FARHEEN KLINE HX OF COLONIC POLYPS   SENT IN PREP: CLENPIQ  DOS: 2023    Past Medical History:   Diagnosis Date   • ADHD (attention deficit hyperactivity disorder)    • Anxiety    • Arthritis    • Bladder disorder    • Cancer (HCC)     squamous cell skin cancer chest/ hx vaginal cancer   • Cervical cancer screening 2021    Normal per patient    • Cervical disc disorder 05/15/2017   • Cervical radiculopathy 05/15/2017   • Cervicalgia    • Colon polyp    • Depression    • Diverticulitis of colon 520   • Head injury    • Hearing loss    • Hyperlipemia    • Low back pain 05/15/2017   • Screening for breast cancer 2021    normal per patient    • Tinnitus    • TMJ disorder involving articular disc abnormality    • VAIN II (vaginal intraepithelial neoplasia grade II)    • Vitamin D deficiency 2021    check vitamin d level      Allergies   Allergen Reactions   • Propoxyphene Mental Status Change   • Epinephrine Other (See Comments)     shaking  shaking  shaking   • Ritalin [Methylphenidate] Headache   • Sulfa Antibiotics Rash and Hives     Past Surgical History:   Procedure Laterality Date   • BLADDER SURGERY      catherized    •  SECTION     • COLONOSCOPY  2021    ELIUD   • COLONOSCOPY N/A 2021    Procedure: COLONOSCOPY WITH ORISE INJECTION,  POLYPECTOMY HOT SNARE, 4 CLIPS APPLIED;  Surgeon: Dimitrios Kline MD;  Location: [facility] ENDOSCOPY;  Service: Gastroenterology;  Laterality: N/A;  DIVERTICULOSIS, COLON POLYP   • DILATION AND CURETTAGE, DIAGNOSTIC / THERAPEUTIC     • ENDOMETRIAL ABLATION     • ENDOSCOPY N/A 2021    Procedure: ESOPHAGOGASTRODUODENOSCOPY WITH BIOPSIES;  Surgeon: Dimitrios Kline MD;  Location: [facility] ENDOSCOPY;  Service: Gastroenterology;  Laterality: N/A;  HIATAL HERNIA   • HYSTERECTOMY  2005    fibroid tumor, ovaries remain   • OTHER SURGICAL HISTORY       Fatty Tumor    • TONSILLECTOMY       Social History     Socioeconomic History   • Marital status:    • Number of children: 2   Tobacco Use   • Smoking status: Former     Packs/day: 1.00     Years: 30.00     Pack years: 30.00     Types: Cigarettes     Quit date: 2003     Years since quittin.0   • Smokeless tobacco: Never   Vaping Use   • Vaping Use: Never used   Substance and Sexual Activity   • Alcohol use: Yes     Comment: 2-3 DRINKS PER WEEK   • Drug use: Never   • Sexual activity: Yes     Partners: Male     Birth control/protection: Surgical, Hysterectomy     Family History   Problem Relation Age of Onset   • Stroke Mother    • Heart disease Mother    • Cancer Mother    • Diabetes Mother    • Arthritis Mother    • Stroke Father    • Heart disease Father    • Other Father         Spine Problems    • Heart attack Father    • Suicide Attempts Brother    • Self-Injurious Behavior  Brother    • Depression Brother    • Bipolar disorder Brother    • Alcohol abuse Brother    • Heart disease Brother    • Diabetes Brother    • Colon polyps Brother    • Cancer Maternal Grandmother    • Depression Son    • ADD / ADHD Son    • Malig Hyperthermia Neg Hx    • Colon cancer Neg Hx        Current Outpatient Medications:   •  ammonium lactate (AMLACTIN) 12 % cream, ammonium lactate 12 % topical cream apply to affected area(s) by topical route QD after showers   Active, Disp: , Rfl:   •  ascorbic acid (VITAMIN C) 500 MG capsule controlled-release CR capsule, Take 500 mg by mouth Daily., Disp: , Rfl:   •  atorvastatin (LIPITOR) 10 MG tablet, TAKE 1 TABLET BY MOUTH DAILY, Disp: 90 tablet, Rfl: 0  •  Cholecalciferol 25 MCG (1000 UT) capsule, Take 1,000 Units by mouth Daily., Disp: , Rfl:   •  estradiol (ESTRACE) 0.1 MG/GM vaginal cream, Insert 1 g into the vagina 2 (Two) Times a Week., Disp: 42.5 g, Rfl: 5  •  estradiol (Lyllana) 0.075 MG/24HR patch, Place 1 patch on the skin as directed by provider 2 (Two) Times a  Week., Disp: 8 patch, Rfl: 11  •  famotidine (Pepcid) 20 MG tablet, Take 1 tablet by mouth Every Night., Disp: 90 tablet, Rfl: 1  •  latanoprost (XALATAN) 0.005 % ophthalmic solution, INSTILL 1 DROP IN BOTH EYES AT BEDTIME, Disp: , Rfl:   •  omeprazole (priLOSEC) 40 MG capsule, Take 1 capsule by mouth Daily., Disp: 30 capsule, Rfl: 2  •  Fexofenadine HCl (MUCINEX ALLERGY PO), Take  by mouth., Disp: , Rfl:   •  meloxicam (MOBIC) 15 MG tablet, Take 1 tablet by mouth Daily., Disp: 30 tablet, Rfl: 1    Answers for HPI/ROS submitted by the patient on 1/20/2023  Please describe your symptoms.: Follow up to colonoscopy  Have you had these symptoms before?: No  How long have you been having these symptoms?: 1-4 days  What is the primary reason for your visit?: Other

## 2023-04-04 ENCOUNTER — TELEPHONE (OUTPATIENT)
Dept: GASTROENTEROLOGY | Facility: CLINIC | Age: 63
End: 2023-04-04
Payer: COMMERCIAL

## 2023-04-04 RX ORDER — SODIUM PICOSULFATE, MAGNESIUM OXIDE, AND ANHYDROUS CITRIC ACID 10; 3.5; 12 MG/160ML; G/160ML; G/160ML
160 LIQUID ORAL TAKE AS DIRECTED
Qty: 320 ML | Refills: 0 | Status: ON HOLD | OUTPATIENT
Start: 2023-04-04 | End: 2023-04-18

## 2023-04-04 NOTE — TELEPHONE ENCOUNTER
I attempted to call Ms Cortés to reminder her of her scheduled colonoscopy on 04.18.23, estimated arrival time of 7:30am.       Recording stated vm box is not set up yet.     Will send [employer] message. juana

## 2023-04-04 NOTE — TELEPHONE ENCOUNTER
PATIENT RETURNED REMINDER CALL FOR COLON. PATIENT DOES NOT HAVE HER BOWEL PREP AND NEEDS THAT SENT OVER TO [facility].

## 2023-04-14 ENCOUNTER — PREP FOR SURGERY (OUTPATIENT)
Dept: OTHER | Facility: HOSPITAL | Age: 63
End: 2023-04-14
Payer: COMMERCIAL

## 2023-04-18 ENCOUNTER — HOSPITAL ENCOUNTER (OUTPATIENT)
Facility: HOSPITAL | Age: 63
Setting detail: HOSPITAL OUTPATIENT SURGERY
Discharge: HOME | End: 2023-04-18
Attending: INTERNAL MEDICINE | Admitting: INTERNAL MEDICINE
Payer: COMMERCIAL

## 2023-04-18 ENCOUNTER — ANESTHESIA (OUTPATIENT)
Dept: GASTROENTEROLOGY | Facility: HOSPITAL | Age: 63
End: 2023-04-18
Payer: COMMERCIAL

## 2023-04-18 ENCOUNTER — ANESTHESIA EVENT (OUTPATIENT)
Dept: GASTROENTEROLOGY | Facility: HOSPITAL | Age: 63
End: 2023-04-18
Payer: COMMERCIAL

## 2023-04-18 VITALS
RESPIRATION RATE: 14 BRPM | WEIGHT: 172.84 LBS | HEART RATE: 65 BPM | OXYGEN SATURATION: 98 % | SYSTOLIC BLOOD PRESSURE: 129 MMHG | BODY MASS INDEX: 28.76 KG/M2 | DIASTOLIC BLOOD PRESSURE: 64 MMHG | TEMPERATURE: 96.2 F

## 2023-04-18 PROCEDURE — 25010000002 PROPOFOL 10 MG/ML EMULSION: Performed by: NURSE ANESTHETIST, CERTIFIED REGISTERED

## 2023-04-18 RX ORDER — LIDOCAINE HYDROCHLORIDE 20 MG/ML
INJECTION, SOLUTION EPIDURAL; INFILTRATION; INTRACAUDAL; PERINEURAL AS NEEDED
Status: DISCONTINUED | OUTPATIENT
Start: 2023-04-18 | End: 2023-04-18 | Stop reason: SURG

## 2023-04-18 RX ORDER — SODIUM CHLORIDE 0.9 % (FLUSH) 0.9 %
10 SYRINGE (ML) INJECTION AS NEEDED
Status: DISCONTINUED | OUTPATIENT
Start: 2023-04-18 | End: 2023-04-18 | Stop reason: HOSPADM

## 2023-04-18 RX ORDER — SODIUM CHLORIDE 0.9 % (FLUSH) 0.9 %
10 SYRINGE (ML) INJECTION EVERY 12 HOURS SCHEDULED
Status: DISCONTINUED | OUTPATIENT
Start: 2023-04-18 | End: 2023-04-18 | Stop reason: HOSPADM

## 2023-04-18 RX ORDER — PROPOFOL 10 MG/ML
VIAL (ML) INTRAVENOUS AS NEEDED
Status: DISCONTINUED | OUTPATIENT
Start: 2023-04-18 | End: 2023-04-18 | Stop reason: SURG

## 2023-04-18 RX ORDER — SODIUM CHLORIDE 9 MG/ML
40 INJECTION, SOLUTION INTRAVENOUS AS NEEDED
Status: DISCONTINUED | OUTPATIENT
Start: 2023-04-18 | End: 2023-04-18 | Stop reason: HOSPADM

## 2023-04-18 RX ORDER — SODIUM CHLORIDE, SODIUM LACTATE, POTASSIUM CHLORIDE, CALCIUM CHLORIDE 600; 310; 30; 20 MG/100ML; MG/100ML; MG/100ML; MG/100ML
9 INJECTION, SOLUTION INTRAVENOUS CONTINUOUS PRN
Status: DISCONTINUED | OUTPATIENT
Start: 2023-04-18 | End: 2023-04-18 | Stop reason: HOSPADM

## 2023-04-18 RX ADMIN — LIDOCAINE HYDROCHLORIDE 100 MG: 20 INJECTION, SOLUTION EPIDURAL; INFILTRATION; INTRACAUDAL; PERINEURAL at 08:11

## 2023-04-18 RX ADMIN — SODIUM CHLORIDE, POTASSIUM CHLORIDE, SODIUM LACTATE AND CALCIUM CHLORIDE: 600; 310; 30; 20 INJECTION, SOLUTION INTRAVENOUS at 08:09

## 2023-04-18 RX ADMIN — PROPOFOL 100 MG: 10 INJECTION, EMULSION INTRAVENOUS at 08:11

## 2023-04-18 RX ADMIN — PROPOFOL 125 MCG/KG/MIN: 10 INJECTION, EMULSION INTRAVENOUS at 08:11

## 2023-04-18 NOTE — ANESTHESIA PREPROCEDURE EVALUATION
Anesthesia Evaluation     NPO Solid Status: > 6 hours  NPO Liquid Status: > 2 hours           Airway   Mallampati: II  TM distance: >3 FB  Neck ROM: full  Dental - normal exam     Pulmonary - negative pulmonary ROS and normal exam   Cardiovascular - normal exam    (+) hyperlipidemia,       Neuro/Psych  (+) psychiatric history Depression and ADHD,    GI/Hepatic/Renal/Endo    (+)  GERD,      Musculoskeletal     Abdominal  - normal exam   Substance History      OB/GYN          Other   arthritis,                      Anesthesia Plan    ASA 2     general     intravenous induction     Anesthetic plan, risks, benefits, and alternatives have been provided, discussed and informed consent has been obtained with: patient.    Plan discussed with CRNA.        CODE STATUS:

## 2023-04-18 NOTE — H&P
ScreeningPre Procedure History & Physical    Chief Complaint:   Screening     Subjective     HPI:   Screening     Past Medical History:   Past Medical History:   Diagnosis Date   • ADHD (attention deficit hyperactivity disorder)    • Anxiety    • Arthritis    • Bladder disorder    • Cancer     squamous cell skin cancer chest/ hx vaginal cancer   • Cervical cancer screening 2021    Normal per patient    • Cervical disc disorder 05/15/2017   • Cervical radiculopathy 05/15/2017   • Cervicalgia    • Colon polyp    • Depression    • Diverticulitis of colon 520   • Head injury    • Hearing loss    • Hyperlipemia    • Low back pain 05/15/2017   • Screening for breast cancer 2021    normal per patient    • Tinnitus    • TMJ disorder involving articular disc abnormality    • VAIN II (vaginal intraepithelial neoplasia grade II)    • Vitamin D deficiency 2021    check vitamin d level        Past Surgical History:  Past Surgical History:   Procedure Laterality Date   • BLADDER SURGERY      catherized    •  SECTION     • COLONOSCOPY  2021    ELIUD   • COLONOSCOPY N/A 2021    Procedure: COLONOSCOPY WITH ORISE INJECTION,  POLYPECTOMY HOT SNARE, 4 CLIPS APPLIED;  Surgeon: Dimitrios Reeves MD;  Location: [facility] ENDOSCOPY;  Service: Gastroenterology;  Laterality: N/A;  DIVERTICULOSIS, COLON POLYP   • DILATION AND CURETTAGE, DIAGNOSTIC / THERAPEUTIC     • ENDOMETRIAL ABLATION     • ENDOSCOPY N/A 2021    Procedure: ESOPHAGOGASTRODUODENOSCOPY WITH BIOPSIES;  Surgeon: Dimitrios Reeves MD;  Location: [facility] ENDOSCOPY;  Service: Gastroenterology;  Laterality: N/A;  HIATAL HERNIA   • HYSTERECTOMY  2005    fibroid tumor, ovaries remain   • OTHER SURGICAL HISTORY      Fatty Tumor    • TONSILLECTOMY         Family History:  Family History   Problem Relation Age of Onset   • Stroke Mother    • Heart disease Mother    • Cancer Mother    • Diabetes Mother    • Arthritis Mother    •  Stroke Father    • Heart disease Father    • Other Father         Spine Problems    • Heart attack Father    • Suicide Attempts Brother    • Self-Injurious Behavior  Brother    • Depression Brother    • Bipolar disorder Brother    • Alcohol abuse Brother    • Heart disease Brother    • Diabetes Brother    • Colon polyps Brother    • Cancer Maternal Grandmother    • Depression Son    • ADD / ADHD Son    • Malig Hyperthermia Neg Hx    • Colon cancer Neg Hx        Social History:   reports that she quit smoking about 20 years ago. Her smoking use included cigarettes. She has a 30.00 pack-year smoking history. She has never used smokeless tobacco. She reports current alcohol use. She reports that she does not use drugs.    Medications:   Medications Prior to Admission   Medication Sig Dispense Refill Last Dose   • ammonium lactate (AMLACTIN) 12 % cream ammonium lactate 12 % topical cream apply to affected area(s) by topical route QD after showers   Active   Past Week   • ascorbic acid (VITAMIN C) 500 MG capsule controlled-release CR capsule Take 1 capsule by mouth Daily.   Past Week   • atorvastatin (LIPITOR) 10 MG tablet TAKE 1 TABLET BY MOUTH DAILY 90 tablet 0 Past Week   • Cholecalciferol 25 MCG (1000 UT) capsule Take 1 capsule by mouth Daily.   Past Week   • estradiol (Lyllana) 0.075 MG/24HR patch Place 1 patch on the skin as directed by provider 2 (Two) Times a Week. 8 patch 11 Past Week   • famotidine (Pepcid) 20 MG tablet Take 1 tablet by mouth Every Night. 90 tablet 1 Past Week   • Fexofenadine HCl (MUCINEX ALLERGY PO) Take  by mouth.   Past Week   • latanoprost (XALATAN) 0.005 % ophthalmic solution INSTILL 1 DROP IN BOTH EYES AT BEDTIME   Past Week   • meloxicam (MOBIC) 15 MG tablet Take 1 tablet by mouth Daily. 30 tablet 1 Past Week   • omeprazole (priLOSEC) 40 MG capsule Take 1 capsule by mouth Daily. 30 capsule 2 Past Week       Allergies:  Propoxyphene, Epinephrine, Ritalin [methylphenidate], and Sulfa  antibiotics        Objective     Blood pressure 127/71, pulse 72, temperature 98 °F (36.7 °C), temperature source Temporal, resp. rate 17, weight 78.4 kg (172 lb 13.5 oz), SpO2 98 %, not currently breastfeeding.    Physical Exam   Constitutional: Pt is oriented to person, place, and time and well-developed, well-nourished, and in no distress.   Mouth/Throat: Oropharynx is clear and moist.   Neck: Normal range of motion.   Cardiovascular: Normal rate, regular rhythm and normal heart sounds.    Pulmonary/Chest: Effort normal and breath sounds normal.   Abdominal: Soft. Nontender  Skin: Skin is warm and dry.   Psychiatric: Mood, memory, affect and judgment normal.     Assessment & Plan     Diagnosis:  Screening colonoscopy  H/o colon polyps     Anticipated Surgical Procedure:  colonoscopy    The risks, benefits, and alternatives of this procedure have been discussed with the patient or the responsible party- the patient understands and agrees to proceed.

## 2023-04-18 NOTE — ANESTHESIA POSTPROCEDURE EVALUATION
Patient: Aleyda Cortés    Procedure Summary     Date: 04/18/23 Room / Location: [facility] ENDOSCOPY 2 / [facility] ENDOSCOPY    Anesthesia Start: 0809 Anesthesia Stop: 0828    Procedure: COLONOSCOPY FOR SCREENING Diagnosis:       History of colonic polyps      (History of colonic polyps [Z86.010])    Surgeons: Dimitrios Reeves MD Provider: Dmitry Carbajal MD    Anesthesia Type: general ASA Status: 2          Anesthesia Type: general    Vitals  Vitals Value Taken Time   /64 04/18/23 0842   Temp 35.7 °C (96.2 °F) 04/18/23 0842   Pulse 59 04/18/23 0845   Resp 14 04/18/23 0842   SpO2 97 % 04/18/23 0844   Vitals shown include unvalidated device data.        Post Anesthesia Care and Evaluation    Patient location during evaluation: bedside  Patient participation: complete - patient participated  Level of consciousness: awake  Pain management: adequate    Airway patency: patent  Anesthetic complications: No anesthetic complications  PONV Status: controlled  Cardiovascular status: acceptable and stable  Respiratory status: acceptable

## 2023-04-27 ENCOUNTER — TELEPHONE (OUTPATIENT)
Dept: OBSTETRICS AND GYNECOLOGY | Facility: CLINIC | Age: 63
End: 2023-04-27
Payer: COMMERCIAL

## 2023-04-27 DIAGNOSIS — Z79.890 POSTMENOPAUSAL HRT (HORMONE REPLACEMENT THERAPY): ICD-10-CM

## 2023-04-27 RX ORDER — ESTRADIOL 0.07 MG/D
1 FILM, EXTENDED RELEASE TRANSDERMAL 2 TIMES WEEKLY
Qty: 8 PATCH | Refills: 1 | Status: SHIPPED | OUTPATIENT
Start: 2023-04-27

## 2023-04-27 NOTE — TELEPHONE ENCOUNTER
The patient is requesting refills of Lyllana patch (Estradiol 0.075 mg/24 HR patich).  She was last seen on 04/11/2022 and her next visit is scheduled for 05/09/23.  Per protocol, I have authorized 2 refills to last her until her follow up appointment.

## 2023-04-27 NOTE — TELEPHONE ENCOUNTER
Tried to call the patient to discuss her message. She did not answer and her voicemail was not set up.

## 2023-04-27 NOTE — TELEPHONE ENCOUNTER
Caller: Aleyda Cortés    Relationship: Self    Best call back number: [phone]    Requested Prescriptions: estradiol (Lyllana) 0.075 MG/24HR patch      Requested Prescriptions      No prescriptions requested or ordered in this encounter        Pharmacy where request should be sent:    [facility]   Last office visit with prescribing clinician: 4/11/2022   Last telemedicine visit with prescribing clinician: 5/9/2023   Next office visit with prescribing clinician: 5/9/2023     Additional details provided by patient: PT WOULD LIKE REFILLS UNTIL HER ANNUAL APPT IN MAY     Does the patient have less than a 3 day supply:  [x] Yes  [] No    Would you like a call back once the refill request has been completed: [] Yes [x] No    If the office needs to give you a call back, can they leave a voicemail: [] Yes [x] No    Froylan Peralta Rep   04/27/23 11:10 EDT

## 2023-04-27 NOTE — TELEPHONE ENCOUNTER
Caller: Aleyda Cortés    Relationship: Self    Best call back number: [phone]    What orders are you requesting (i.e. lab or imaging): MAMMOGRAM    In what timeframe would the patient need to come in: ASAP    Where will you receive your lab/imaging services: [facility]    Additional notes: PT WOULD LIKE MAMMOGRAM ORDER PLACED SO SHE CAN SCHEDULE TO HAVE THAT DONE

## 2023-05-08 NOTE — PROGRESS NOTES
HPI:   62 y.o. . Presents for well woman exam. Post menopausal, using HRT, DESIRES TO CONTINUE, COUNSELED REGARDING WHI STUDY, ATROPHY SYMPTOMS IMPROVED ON CURRENT TREATMENT  Menses:   NONE   Pain:  None  Last pap: normal   Urinary QUALITY measure: mild urinary leakage upon urge, declines management     Hx VAIN2    IGP,rfx Aptima HPV All Pth (2022 13:25)     Past Medical History:   Diagnosis Date   • Abnormal Pap smear of cervix    • ADHD (attention deficit hyperactivity disorder)    • Anxiety    • Arthritis    • Bladder disorder    • Cancer     squamous cell skin cancer chest/ hx vaginal cancer   • Cervical cancer screening 2021    Normal per patient    • Cervical disc disorder 05/15/2017   • Cervical radiculopathy 05/15/2017   • Cervicalgia    • Colon polyp    • Depression    • Diverticulitis of colon 520   • Head injury    • Hearing loss    • Hyperlipemia    • Low back pain 05/15/2017   • Screening for breast cancer 2021    normal per patient    • Tinnitus    • TMJ disorder involving articular disc abnormality    • VAIN II (vaginal intraepithelial neoplasia grade II)    • Vitamin D deficiency 2021    check vitamin d level       Past Surgical History:   Procedure Laterality Date   • BLADDER SURGERY      catherized    •  SECTION     • COLONOSCOPY  2021    ELIUD   • COLONOSCOPY N/A 2021    Procedure: COLONOSCOPY WITH ORISE INJECTION,  POLYPECTOMY HOT SNARE, 4 CLIPS APPLIED;  Surgeon: Dimitrios Reeves MD;  Location: [facility] ENDOSCOPY;  Service: Gastroenterology;  Laterality: N/A;  DIVERTICULOSIS, COLON POLYP   • COLONOSCOPY N/A 2023    Procedure: COLONOSCOPY FOR SCREENING;  Surgeon: Dimitrios Reeves MD;  Location: [facility] ENDOSCOPY;  Service: Gastroenterology;  Laterality: N/A;  diverticulosis   • DILATION AND CURETTAGE, DIAGNOSTIC / THERAPEUTIC     • ENDOMETRIAL ABLATION     • ENDOSCOPY N/A 2021    Procedure: ESOPHAGOGASTRODUODENOSCOPY  "WITH BIOPSIES;  Surgeon: Dimitrios Reeves MD;  Location: [facility] ENDOSCOPY;  Service: Gastroenterology;  Laterality: N/A;  HIATAL HERNIA   • HYSTERECTOMY  2005    fibroid tumor, ovaries remain   • OTHER SURGICAL HISTORY      Fatty Tumor    • TONSILLECTOMY        Family History   Problem Relation Age of Onset   • Stroke Father    • Heart disease Father    • Other Father         Spine Problems    • Heart attack Father    • Stroke Mother    • Heart disease Mother    • Cancer Mother    • Diabetes Mother    • Arthritis Mother    • Suicide Attempts Brother    • Self-Injurious Behavior  Brother    • Depression Brother    • Bipolar disorder Brother    • Alcohol abuse Brother    • Heart disease Brother    • Diabetes Brother    • Colon polyps Brother    • Depression Son    • ADD / ADHD Son    • Cancer Maternal Grandmother    • Malig Hyperthermia Neg Hx    • Colon cancer Neg Hx    • Breast cancer Neg Hx    • Ovarian cancer Neg Hx    • Uterine cancer Neg Hx    • Prostate cancer Neg Hx      Allergies as of 05/09/2023 - Reviewed 05/09/2023   Allergen Reaction Noted   • Propoxyphene Mental Status Change 07/06/2021   • Epinephrine Other (See Comments) 11/17/2021   • Ritalin [methylphenidate] Headache 07/06/2021   • Sulfa antibiotics Rash and Hives 07/06/2021        PCP: does manage PMHx and preventative labs    /90   Pulse 69   Ht 165.1 cm (65\")   Wt 79.4 kg (175 lb)   BMI 29.12 kg/m²     PHYSICAL EXAM: Chaperone present   General- NAD, alert and oriented, appropriate  Psych- Normal mood, good memory  Neck- No masses, no thyroid enlargement  Lymphatic- No palpable neck, axillary, or groin nodes  CV- Regular rhythm, no murmurs  Resp- CTA to bases, no wheezes  Abdomen- Soft, non distended, non tender, no masses  Breast left-  Bilaterally symmetrical, no masses, non tender, no nipple discharge  Breast right- Bilaterally symmetrical, no masses, non tender, no nipple discharge  External genitalia- Normal female, no " lesions  Urethra/meatus- Normal, no masses, non tender, no prolapse  Bladder- Normal, no masses, non tender, no prolapse  Vagina- Mild atrophy, no lesions, no discharge, no prolapse  Cvx- Absent  Uterus- Absent  Adnexa- No mass, non tender  Anus/Rectum/Perineum- Not performed  Ext- No edema, no cyanosis    Skin- No lesions, no rashes, no acanthosis nigricans    ASSESSMENT and PLAN:    Diagnoses and all orders for this visit:    1. Vaginal atrophy (Primary)  -     estradiol (Lyllana) 0.075 MG/24HR patch; Place 1 patch on the skin as directed by provider 2 (Two) Times a Week.  Dispense: 8 patch; Refill: 3  -     estradiol (ESTRACE) 0.1 MG/GM vaginal cream; Insert 1 g into the vagina 2 (Two) Times a Week.  Dispense: 42.5 g; Refill: 3    2. Postmenopausal HRT (hormone replacement therapy)    3. Encounter for screening mammogram for malignant neoplasm of breast  -     Mammo Screening Digital Tomosynthesis Bilateral With CAD; Future        Preventative:   BREAST HEALTH- Breast awareness, self breast exam, MMG and/or MRI reviewed per society guidelines and her individual history. Screen: Updated today  CERVICAL CANCER Screening- Reviewed current ASCCP guidelines for screening w and wo cotest HPV, age specific.  Screen: Already up to date  COLON CANCER Screening- Reviewed current medical society guidelines and options.  Screen:  Already up to date  SEXUAL HEALTH: Declines STD screening  BONE HEALTH- Reviewed current medical society guidelines and options for testing, calcium and vit D intake.  Weight bearing exercise.  DEXA: Not medically needed  VACCINATIONS Recommended: Zoster (49yo and older), Pneumococcal (66yo and older).  Importance discussed, risk being unvaccinated reviewed.  Questions answered  Smoking status- NON SMOKER  Follow up PCP/Specialist PMHx and Labs  Genetic testing: Does not qualify.  HRT R/B/A/SE/E all options including non-FDA approved options discussed w pt.         HRT- I recommend the lowest dose  possible, for the shortest period of time.  Risks HRT NLT breast CA (progestin), CVA, MI, LANDON.    Urinary LEAKAGE discussed.  Reviewed risks, benefits, alternatives, side-effects, efficacy of options: expectant, kegels, biofeedback, pessary, and/or Urogyn referral. Declines management    Follow Up:  Return in about 1 year (around 5/9/2024).        Bre Toscano, APRN  05/09/2023

## 2023-05-09 ENCOUNTER — OFFICE VISIT (OUTPATIENT)
Dept: OBSTETRICS AND GYNECOLOGY | Facility: CLINIC | Age: 63
End: 2023-05-09
Payer: COMMERCIAL

## 2023-05-09 VITALS
SYSTOLIC BLOOD PRESSURE: 137 MMHG | BODY MASS INDEX: 29.16 KG/M2 | HEIGHT: 65 IN | WEIGHT: 175 LBS | HEART RATE: 69 BPM | DIASTOLIC BLOOD PRESSURE: 85 MMHG

## 2023-05-09 DIAGNOSIS — N95.2 VAGINAL ATROPHY: Primary | ICD-10-CM

## 2023-05-09 DIAGNOSIS — Z79.890 POSTMENOPAUSAL HRT (HORMONE REPLACEMENT THERAPY): ICD-10-CM

## 2023-05-09 DIAGNOSIS — Z12.31 ENCOUNTER FOR SCREENING MAMMOGRAM FOR MALIGNANT NEOPLASM OF BREAST: ICD-10-CM

## 2023-05-09 RX ORDER — ESTRADIOL 0.07 MG/D
1 FILM, EXTENDED RELEASE TRANSDERMAL 2 TIMES WEEKLY
Qty: 8 PATCH | Refills: 3 | Status: SHIPPED | OUTPATIENT
Start: 2023-05-11

## 2023-05-09 RX ORDER — ESTRADIOL 0.1 MG/G
1 CREAM VAGINAL 2 TIMES WEEKLY
Qty: 42.5 G | Refills: 3 | Status: SHIPPED | OUTPATIENT
Start: 2023-05-11

## 2023-06-30 ENCOUNTER — TELEPHONE (OUTPATIENT)
Dept: OBSTETRICS AND GYNECOLOGY | Facility: CLINIC | Age: 63
End: 2023-06-30

## 2023-06-30 NOTE — TELEPHONE ENCOUNTER
PT RECEIVED BILL FOR ANNUAL WITH AN ADDITIONAL COST CODED AS VAGINOSIS- SHE STATED SHE DID DISCUSS NOT WANTING TO GO OFF THE PATCH DUE TO DRYNESS BUT NO  NEW DX WAS MADE AND NO NEW RX WAS WRITTEN.     SHE WAS UNSURE WHY THERE WAS AN ADDITIONAL CHARGE. SHE HAS ALREADY SPOKE WITH BILLING.      PLEASE ADVISE PATIENT -[phone], SHE IS AVAILABLE MONDAY TO ANSWER THE PHONE, NO VM SET UP.

## 2023-08-01 NOTE — TELEPHONE ENCOUNTER
I contacted the patient on 8/1/23 to let her know that CR is checking on this again.  I did tell her if she received another phone call regarding this to let her not.

## 2023-08-16 ENCOUNTER — HOSPITAL ENCOUNTER (OUTPATIENT)
Dept: MAMMOGRAPHY | Facility: HOSPITAL | Age: 63
Discharge: HOME | End: 2023-08-16
Admitting: NURSE PRACTITIONER
Payer: COMMERCIAL

## 2023-08-16 DIAGNOSIS — Z12.31 ENCOUNTER FOR SCREENING MAMMOGRAM FOR MALIGNANT NEOPLASM OF BREAST: ICD-10-CM

## 2023-08-16 PROCEDURE — 77063 BREAST TOMOSYNTHESIS BI: CPT

## 2023-08-16 PROCEDURE — 77067 SCR MAMMO BI INCL CAD: CPT

## 2023-08-17 DIAGNOSIS — R92.8 ABNORMALITY OF LEFT BREAST ON SCREENING MAMMOGRAM: Primary | ICD-10-CM

## 2023-08-21 ENCOUNTER — APPOINTMENT (OUTPATIENT)
Dept: CT IMAGING | Facility: HOSPITAL | Age: 63
End: 2023-08-21
Payer: COMMERCIAL

## 2023-08-21 ENCOUNTER — HOSPITAL ENCOUNTER (EMERGENCY)
Facility: HOSPITAL | Age: 63
Discharge: HOME | End: 2023-08-21
Attending: EMERGENCY MEDICINE
Payer: COMMERCIAL

## 2023-08-21 VITALS
BODY MASS INDEX: 29.02 KG/M2 | DIASTOLIC BLOOD PRESSURE: 78 MMHG | HEART RATE: 65 BPM | OXYGEN SATURATION: 99 % | RESPIRATION RATE: 20 BRPM | HEIGHT: 65 IN | SYSTOLIC BLOOD PRESSURE: 150 MMHG | WEIGHT: 174.16 LBS | TEMPERATURE: 98.4 F

## 2023-08-21 DIAGNOSIS — J01.30 ACUTE NON-RECURRENT SPHENOIDAL SINUSITIS: ICD-10-CM

## 2023-08-21 DIAGNOSIS — S05.11XA CONTUSION OF RIGHT EYE, INITIAL ENCOUNTER: Primary | ICD-10-CM

## 2023-08-21 DIAGNOSIS — S00.81XA ABRASION OF FACE, INITIAL ENCOUNTER: ICD-10-CM

## 2023-08-21 LAB
ALBUMIN SERPL-MCNC: 4.1 G/DL (ref 3.5–5.2)
ALBUMIN/GLOB SERPL: 1.9 G/DL
ALP SERPL-CCNC: 50 U/L (ref 39–117)
ALT SERPL W P-5'-P-CCNC: 11 U/L (ref 1–33)
ANION GAP SERPL CALCULATED.3IONS-SCNC: 8.2 MMOL/L (ref 5–15)
AST SERPL-CCNC: 15 U/L (ref 1–32)
BASOPHILS # BLD AUTO: 0.04 10*3/MM3 (ref 0–0.2)
BASOPHILS NFR BLD AUTO: 0.5 % (ref 0–1.5)
BILIRUB SERPL-MCNC: 0.6 MG/DL (ref 0–1.2)
BUN SERPL-MCNC: 17 MG/DL (ref 8–23)
BUN/CREAT SERPL: 24.3 (ref 7–25)
CALCIUM SPEC-SCNC: 8.9 MG/DL (ref 8.6–10.5)
CHLORIDE SERPL-SCNC: 105 MMOL/L (ref 98–107)
CO2 SERPL-SCNC: 26.8 MMOL/L (ref 22–29)
CREAT SERPL-MCNC: 0.7 MG/DL (ref 0.57–1)
DEPRECATED RDW RBC AUTO: 45.1 FL (ref 37–54)
EGFRCR SERPLBLD CKD-EPI 2021: 96.7 ML/MIN/1.73
EOSINOPHIL # BLD AUTO: 0.08 10*3/MM3 (ref 0–0.4)
EOSINOPHIL NFR BLD AUTO: 1 % (ref 0.3–6.2)
ERYTHROCYTE [DISTWIDTH] IN BLOOD BY AUTOMATED COUNT: 13 % (ref 12.3–15.4)
GLOBULIN UR ELPH-MCNC: 2.2 GM/DL
GLUCOSE SERPL-MCNC: 100 MG/DL (ref 65–99)
HCT VFR BLD AUTO: 40.7 % (ref 34–46.6)
HGB BLD-MCNC: 13.8 G/DL (ref 12–15.9)
IMM GRANULOCYTES # BLD AUTO: 0.01 10*3/MM3 (ref 0–0.05)
IMM GRANULOCYTES NFR BLD AUTO: 0.1 % (ref 0–0.5)
LYMPHOCYTES # BLD AUTO: 2.1 10*3/MM3 (ref 0.7–3.1)
LYMPHOCYTES NFR BLD AUTO: 27.2 % (ref 19.6–45.3)
MCH RBC QN AUTO: 32.2 PG (ref 26.6–33)
MCHC RBC AUTO-ENTMCNC: 33.9 G/DL (ref 31.5–35.7)
MCV RBC AUTO: 95.1 FL (ref 79–97)
MONOCYTES # BLD AUTO: 0.55 10*3/MM3 (ref 0.1–0.9)
MONOCYTES NFR BLD AUTO: 7.1 % (ref 5–12)
NEUTROPHILS NFR BLD AUTO: 4.93 10*3/MM3 (ref 1.7–7)
NEUTROPHILS NFR BLD AUTO: 64.1 % (ref 42.7–76)
NRBC BLD AUTO-RTO: 0 /100 WBC (ref 0–0.2)
PLATELET # BLD AUTO: 230 10*3/MM3 (ref 140–450)
PMV BLD AUTO: 10.5 FL (ref 6–12)
POTASSIUM SERPL-SCNC: 4.6 MMOL/L (ref 3.5–5.2)
PROT SERPL-MCNC: 6.3 G/DL (ref 6–8.5)
RBC # BLD AUTO: 4.28 10*6/MM3 (ref 3.77–5.28)
SODIUM SERPL-SCNC: 140 MMOL/L (ref 136–145)
WBC NRBC COR # BLD: 7.71 10*3/MM3 (ref 3.4–10.8)

## 2023-08-21 PROCEDURE — 99285 EMERGENCY DEPT VISIT HI MDM: CPT

## 2023-08-21 PROCEDURE — 85025 COMPLETE CBC W/AUTO DIFF WBC: CPT

## 2023-08-21 PROCEDURE — 36415 COLL VENOUS BLD VENIPUNCTURE: CPT

## 2023-08-21 PROCEDURE — 70481 CT ORBIT/EAR/FOSSA W/DYE: CPT

## 2023-08-21 PROCEDURE — 25510000001 IOPAMIDOL PER 1 ML: Performed by: PHYSICIAN ASSISTANT

## 2023-08-21 PROCEDURE — 80053 COMPREHEN METABOLIC PANEL: CPT

## 2023-08-21 RX ORDER — AMOXICILLIN AND CLAVULANATE POTASSIUM 875; 125 MG/1; MG/1
1 TABLET, FILM COATED ORAL 2 TIMES DAILY
Qty: 14 TABLET | Refills: 0 | Status: SHIPPED | OUTPATIENT
Start: 2023-08-21 | End: 2023-08-28

## 2023-08-21 RX ADMIN — IOPAMIDOL 100 ML: 755 INJECTION, SOLUTION INTRAVENOUS at 17:25

## 2023-08-21 NOTE — ED PROVIDER NOTES
Time: 3:21 PM EDT  Date of encounter:  8/21/2023  Independent Historian/Clinical History and Information was obtained by:   Patient    History is limited by: N/A    Chief Complaint   Patient presents with    Facial Injury         History of Present Illness:  Patient is a 62 y.o. year old female who presents to the emergency department for evaluation of facial injury.  The patient tripped over her dog outside on Saturday, (2 days ago).  Hit her face on concrete.  No LOC.  Has had pain, swelling, erythema around her right eye.  Denies changes in vision.  Has been having clear drainage.  Went to urgent care to get evaluated and was advised to come to the ED for CT scans. 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    Cervical cancer screening 02/2021    Normal per patient     Cervical disc disorder 05/15/2017    Cervical radiculopathy 05/15/2017    Cervicalgia     Colon polyp     Depression     Diverticulitis of colon 052021    Head injury     Hearing loss     Hyperlipemia     Low back pain 05/15/2017    Screening for breast cancer 02/2021    normal per patient     Tinnitus     TMJ disorder involving articular disc abnormality     VAIN II (vaginal intraepithelial neoplasia grade II) 2008    Vitamin D deficiency 06/01/2021    check vitamin d level      Past Surgical History:   Procedure Laterality Date    ANKLE ARTHROPLASTY Right 11/2022    BLADDER SURGERY       catherized      SECTION      COLONOSCOPY  2021    ELIUD    COLONOSCOPY N/A 2021    Procedure: COLONOSCOPY WITH ORISE INJECTION,  POLYPECTOMY HOT SNARE, 4 CLIPS APPLIED;  Surgeon: Dimitrios Reeves MD;  Location: [facility] ENDOSCOPY;  Service: Gastroenterology;  Laterality: N/A;  DIVERTICULOSIS, COLON POLYP    COLONOSCOPY N/A 2023    Procedure: COLONOSCOPY FOR SCREENING;  Surgeon: Dimitrios Reeves MD;  Location: [facility] ENDOSCOPY;  Service: Gastroenterology;  Laterality: N/A;  diverticulosis    DILATION AND CURETTAGE, DIAGNOSTIC / THERAPEUTIC      ENDOMETRIAL ABLATION      ENDOSCOPY N/A 2021    Procedure: ESOPHAGOGASTRODUODENOSCOPY WITH BIOPSIES;  Surgeon: Dimitrios Reeves MD;  Location: [facility] ENDOSCOPY;  Service: Gastroenterology;  Laterality: N/A;  HIATAL HERNIA    HYSTERECTOMY      fibroid tumor, ovaries remain    OTHER SURGICAL HISTORY      Fatty Tumor     TONSILLECTOMY       Family History   Problem Relation Age of Onset    Stroke Father     Heart disease Father     Other Father         Spine Problems     Heart attack Father     Stroke Mother     Heart disease Mother     Cancer Mother     Diabetes Mother     Arthritis Mother     Suicide Attempts Brother     Self-Injurious Behavior  Brother     Depression Brother     Bipolar disorder Brother     Alcohol abuse Brother     Heart disease Brother     Diabetes Brother     Colon polyps Brother     Depression Son     ADD / ADHD Son     Cancer Maternal Grandmother     Malig Hyperthermia Neg Hx     Colon cancer Neg Hx     Breast cancer Neg Hx     Ovarian cancer Neg Hx     Uterine cancer Neg Hx     Prostate cancer Neg Hx        Home Medications:  Prior to Admission medications    Medication Sig Start Date End Date Taking? Authorizing Provider   ammonium lactate (AMLACTIN) 12 % cream ammonium lactate 12 % topical cream apply to affected area(s) by topical route QD after showers   Active    Provider, MD Laxmi  "  atorvastatin (LIPITOR) 10 MG tablet TAKE 1 TABLET BY MOUTH DAILY 10/11/22   Osbaldo Edmond APRN   Cholecalciferol 25 MCG (1000 UT) capsule Take 1 capsule by mouth Daily.    Provider, MD Laxmi   estradiol (ESTRACE) 0.1 MG/GM vaginal cream Insert 1 g into the vagina 2 (Two) Times a Week. 23   Bre Toscano APRN   estradiol (Lyllana) 0.075 MG/24HR patch Place 1 patch on the skin as directed by provider 2 (Two) Times a Week. 23   Bre Toscano APRN   latanoprost (XALATAN) 0.005 % ophthalmic solution INSTILL 1 DROP IN BOTH EYES AT BEDTIME 21   Provider, MD Laxmi   omeprazole (priLOSEC) 40 MG capsule Take 1 capsule by mouth Daily. 10/15/21   Osbaldo Edmond APRN        Social History:   Social History     Tobacco Use    Smoking status: Former     Packs/day: 1.00     Years: 30.00     Pack years: 30.00     Types: Cigarettes     Quit date: 2003     Years since quittin.6    Smokeless tobacco: Never   Vaping Use    Vaping Use: Never used   Substance Use Topics    Alcohol use: Yes     Comment: 2-3 DRINKS PER WEEK    Drug use: Never         Review of Systems:  Review of Systems   Constitutional:  Negative for fever.   Eyes:  Positive for pain and discharge. Negative for visual disturbance.   Neurological:  Negative for syncope and headaches.   All other systems reviewed and are negative.     Physical Exam:  /78 (Patient Position: Sitting)   Pulse 65   Temp 98.4 øF (36.9 øC) (Oral)   Resp 20   Ht 165.1 cm (65\")   Wt 79 kg (174 lb 2.6 oz)   SpO2 99%   BMI 28.98 kg/mý         Physical Exam  Vitals and nursing note reviewed.   Constitutional:       General: She is not in acute distress.     Appearance: Normal appearance. She is normal weight. She is not ill-appearing, toxic-appearing or diaphoretic.   HENT:      Head: Normocephalic. Abrasion and contusion present. No raccoon eyes or Castellano's sign.        Nose: Nose normal.      Mouth/Throat:      Mouth: Mucous " membranes are moist.   Eyes:      Extraocular Movements: Extraocular movements intact.      Conjunctiva/sclera: Conjunctivae normal.      Pupils: Pupils are equal, round, and reactive to light.      Comments: Erythema around right eye. PERRLA and EOMs intact. Abrasions to right upper cheek. TTP over supra and infraorbital rim.    Pulmonary:      Effort: Pulmonary effort is normal.   Abdominal:      General: Abdomen is flat. There is no distension.   Musculoskeletal:         General: Normal range of motion.      Cervical back: Normal range of motion and neck supple.   Skin:     General: Skin is warm and dry.      Findings: Bruising present.   Neurological:      General: No focal deficit present.      Mental Status: She is alert and oriented to person, place, and time.   Psychiatric:         Mood and Affect: Mood normal.         Behavior: Behavior normal.         Thought Content: Thought content normal.         Judgment: Judgment normal.                 Procedures:  Procedures      Medical Decision Making:    Comorbidities that affect care:    Hyperlipidemia    External Notes reviewed:    Previous Clinic Note: Urgent care visit from 8- where patient was seen for same complaint      The following orders were placed and all results were independently analyzed by me:  Orders Placed This Encounter   Procedures    CT Orbits With Contrast    Comprehensive Metabolic Panel    CBC Auto Differential    CBC & Differential       Medications Given in the Emergency Department:  Medications   iopamidol (ISOVUE-370) 76 % injection 100 mL (100 mL Intravenous Given 8/21/23 1725)        ED Course:    The patient was initially evaluated in the triage area where orders were placed. The patient was later dispositioned by Damon Pollack PA-C.      The patient was advised to stay for completion of workup which includes but is not limited to communication of labs and radiological results, reassessment and plan. The patient was  advised that leaving prior to disposition by a provider could result in critical findings that are not communicated to the patient.          Labs:    Lab Results (last 24 hours)       Procedure Component Value Units Date/Time    Comprehensive Metabolic Panel [[id number]]  (Abnormal) Collected: 08/21/23 1528    Specimen: Blood from Arm, Right Updated: 08/21/23 1556     Glucose 100 mg/dL      BUN 17 mg/dL      Creatinine 0.70 mg/dL      Sodium 140 mmol/L      Potassium 4.6 mmol/L      Chloride 105 mmol/L      CO2 26.8 mmol/L      Calcium 8.9 mg/dL      Total Protein 6.3 g/dL      Albumin 4.1 g/dL      ALT (SGPT) 11 U/L      AST (SGOT) 15 U/L      Alkaline Phosphatase 50 U/L      Total Bilirubin 0.6 mg/dL      Globulin 2.2 gm/dL      A/G Ratio 1.9 g/dL      BUN/Creatinine Ratio 24.3     Anion Gap 8.2 mmol/L      eGFR 96.7 mL/min/1.73     Narrative:      GFR Normal >60  Chronic Kidney Disease <60  Kidney Failure <15      CBC & Differential [[id number]]  (Normal) Collected: 08/21/23 1528    Specimen: Blood from Arm, Right Updated: 08/21/23 1537    Narrative:      The following orders were created for panel order CBC & Differential.  Procedure                               Abnormality         Status                     ---------                               -----------         ------                     CBC Auto Differential[[id number]]        Normal              Final result                 Please view results for these tests on the individual orders.    CBC Auto Differential [[id number]]  (Normal) Collected: 08/21/23 1528    Specimen: Blood from Arm, Right Updated: 08/21/23 1537     WBC 7.71 10*3/mm3      RBC 4.28 10*6/mm3      Hemoglobin 13.8 g/dL      Hematocrit 40.7 %      MCV 95.1 fL      MCH 32.2 pg      MCHC 33.9 g/dL      RDW 13.0 %      RDW-SD 45.1 fl      MPV 10.5 fL      Platelets 230 10*3/mm3      Neutrophil % 64.1 %      Lymphocyte % 27.2 %      Monocyte % 7.1 %      Eosinophil % 1.0 %      Basophil % 0.5 %       Immature Grans % 0.1 %      Neutrophils, Absolute 4.93 10*3/mm3      Lymphocytes, Absolute 2.10 10*3/mm3      Monocytes, Absolute 0.55 10*3/mm3      Eosinophils, Absolute 0.08 10*3/mm3      Basophils, Absolute 0.04 10*3/mm3      Immature Grans, Absolute 0.01 10*3/mm3      nRBC 0.0 /100 WBC              Imaging:    CT Orbits With Contrast    Result Date: 8/21/2023  PROCEDURE: CT ORBITS W CONTRAST  COMPARISON: [facility], CT, HEAD W/WO CONTRAST, 11/26/2003, 16:41.  INDICATIONS: facial injury, abrasion/erythema/swelling/discharge around right eye  PROTOCOL:   Standard imaging protocol performed    RADIATION:   DLP: 156.6mGy*cm   Automated exposure control was utilized to minimize radiation dose. CONTRAST: 75cc Isovue 370 I.V. LABS:   eGFR: >60ml/min/1.73m2  TECHNIQUE: Axial images of the orbits with contrast. Coronal and sagittal reformatted images were performed.  FINDINGS: There is right periorbital soft tissue swelling.  The extra-ocular muscles are symmetric.  There are no intraconal or extraconal masses.  No evidence of postseptal inflammatory change.  No evidence of abscess.  There are inflammatory changes in the ethmoid and sphenoid sinuses.  There is some foamy secretion in the right sphenoid sinus suggesting sinusitis.  The optic nerves have a symmetric appearance.  The visualized brain is normal.  No fractures or acute osseous abnormalities are identified.  The ostiomeatal units are patent.  IMPRESSION: 1. Right periorbital soft tissue swelling.  2. Inflammatory changes in ethmoid and sphenoid sinuses.  Foamy secretion in the right sphenoid sinus may be secondary to sinusitis.   AQUILES JARAMILLO MD       Electronically Signed and Approved By: AQUILES JARAMILLO MD on 8/21/2023 at 17:39                Differential Diagnosis and Discussion:      Eye Pain/Blurred Vision: Differential diagnosis includes but is not limited to dacryocystitis, hordeolum, chalazion, periorbital cellulitis, cavernous  sinus thrombosis, blepharitis, and glaucoma.    All labs were reviewed and interpreted by me.  CT scan radiology impression was interpreted by me.    MDM  Number of Diagnoses or Management Options  Diagnosis management comments: Patient presented to the emergency department today for evaluation of right eye injury.  CBC and CMP are unremarkable.  CT of the orbits was completed and does note soft tissue swelling and sinusitis.  I will be out of the abrasions on her face.       Amount and/or Complexity of Data Reviewed  Clinical lab tests: reviewed and ordered  Tests in the radiology section of [facility]: reviewed and ordered    Risk of Complications, Morbidity, and/or Mortality  Presenting problems: moderate  Diagnostic procedures: moderate  Management options: low    Patient Progress  Patient progress: stable     Patient Care Considerations:    NARCOTICS: I considered prescribing opiate pain medication as an outpatient, however there is no fracture on CT      Consultants/Shared Management Plan:    None    Social Determinants of Health:    Patient is independent, reliable, and has access to care.       Disposition and Care Coordination:    Discharged: The patient is suitable and stable for discharge with no need for consideration of observation or admission.    I have explained the patient's condition, diagnoses and treatment plan based on the information available to me at this time. I have answered questions and addressed any concerns. The patient has a good  understanding of the patient's diagnosis, condition, and treatment plan as can be expected at this point. The vital signs have been stable. The patient's condition is stable and appropriate for discharge from the emergency department.      The patient will pursue further outpatient evaluation with the primary care physician or other designated or consulting physician as outlined in the discharge instructions. They are agreeable to this plan of care and follow-up  instructions have been explained in detail. The patient has received these instructions in written format and have expressed an understanding of the discharge instructions. The patient is aware that any significant change in condition or worsening of symptoms should prompt an immediate return to this or the closest emergency department or call to 911.  I have explained discharge medications and the need for follow up with the patient/caretakers. This was also printed in the discharge instructions. Patient was discharged with the following medications and follow up:      Medication List        New Prescriptions      amoxicillin-clavulanate 875-125 MG per tablet  Commonly known as: AUGMENTIN  Take 1 tablet by mouth 2 (Two) Times a Day for 7 days.               Where to Get Your Medications        These medications were sent to [id number] - [place], [place] - [address] AT [facility] - [phone]  - [phone] FX  [address] [place] [address]      Phone: [phone]   amoxicillin-clavulanate 875-125 MG per tablet      Candice Perez MD  [address]  [address]  [place] [address]  [phone]             Final diagnoses:   Contusion of right eye, initial encounter   Abrasion of face, initial encounter   Acute non-recurrent sphenoidal sinusitis        ED Disposition       ED Disposition   Discharge    Condition   Stable    Comment   --               This medical record created using voice recognition software.             Damon Pollack PA-C  08/21/23 0442

## 2023-08-21 NOTE — DISCHARGE INSTRUCTIONS
Your CT completed in the emergency department today does not show any fractures.  Please take the full course of antibiotics as directed.  You may apply ice 15 to 20 minutes at a time 4-5 times a day to help with swelling.

## 2023-09-01 ENCOUNTER — HOSPITAL ENCOUNTER (OUTPATIENT)
Dept: ULTRASOUND IMAGING | Facility: HOSPITAL | Age: 63
Discharge: HOME | End: 2023-09-01
Payer: COMMERCIAL

## 2023-09-01 ENCOUNTER — HOSPITAL ENCOUNTER (OUTPATIENT)
Dept: MAMMOGRAPHY | Facility: HOSPITAL | Age: 63
Discharge: HOME | End: 2023-09-01
Payer: COMMERCIAL

## 2023-09-01 DIAGNOSIS — R92.8 ABNORMALITY OF LEFT BREAST ON SCREENING MAMMOGRAM: ICD-10-CM

## 2023-09-01 PROCEDURE — 76642 ULTRASOUND BREAST LIMITED: CPT

## 2023-09-01 PROCEDURE — 77065 DX MAMMO INCL CAD UNI: CPT

## 2023-09-01 PROCEDURE — G0279 TOMOSYNTHESIS, MAMMO: HCPCS

## 2023-09-05 DIAGNOSIS — R92.8 ABNORMALITY OF LEFT BREAST ON SCREENING MAMMOGRAM: Primary | ICD-10-CM

## 2023-09-25 ENCOUNTER — HOSPITAL ENCOUNTER (OUTPATIENT)
Dept: MAMMOGRAPHY | Facility: HOSPITAL | Age: 63
Discharge: HOME | End: 2023-09-25
Payer: COMMERCIAL

## 2023-09-25 ENCOUNTER — PATIENT OUTREACH (OUTPATIENT)
Dept: ONCOLOGY | Facility: HOSPITAL | Age: 63
End: 2023-09-25
Payer: COMMERCIAL

## 2023-09-25 DIAGNOSIS — R92.8 ABNORMALITY OF LEFT BREAST ON SCREENING MAMMOGRAM: ICD-10-CM

## 2023-09-25 PROCEDURE — A4648 IMPLANTABLE TISSUE MARKER: HCPCS

## 2023-09-25 PROCEDURE — 0 LIDOCAINE 1 % SOLUTION: Performed by: NURSE PRACTITIONER

## 2023-09-25 PROCEDURE — 76098 X-RAY EXAM SURGICAL SPECIMEN: CPT

## 2023-09-25 PROCEDURE — C1819 TISSUE LOCALIZATION-EXCISION: HCPCS

## 2023-09-25 PROCEDURE — 88305 TISSUE EXAM BY PATHOLOGIST: CPT | Performed by: NURSE PRACTITIONER

## 2023-09-25 RX ORDER — LIDOCAINE HYDROCHLORIDE 10 MG/ML
20 INJECTION, SOLUTION INFILTRATION; PERINEURAL ONCE
Status: CANCELLED | OUTPATIENT
Start: 2023-09-25 | End: 2023-09-25

## 2023-09-25 RX ORDER — LIDOCAINE HYDROCHLORIDE 10 MG/ML
20 INJECTION, SOLUTION INFILTRATION; PERINEURAL ONCE
Status: COMPLETED | OUTPATIENT
Start: 2023-09-25 | End: 2023-09-25

## 2023-09-25 RX ADMIN — LIDOCAINE HYDROCHLORIDE 20 ML: 10 INJECTION, SOLUTION INFILTRATION; PERINEURAL at 14:59

## 2023-09-27 ENCOUNTER — PATIENT OUTREACH (OUTPATIENT)
Dept: ONCOLOGY | Facility: HOSPITAL | Age: 63
End: 2023-09-27
Payer: COMMERCIAL

## 2023-09-29 ENCOUNTER — PATIENT OUTREACH (OUTPATIENT)
Dept: ONCOLOGY | Facility: HOSPITAL | Age: 63
End: 2023-09-29
Payer: COMMERCIAL

## 2023-10-04 ENCOUNTER — TELEPHONE (OUTPATIENT)
Dept: OBSTETRICS AND GYNECOLOGY | Facility: CLINIC | Age: 63
End: 2023-10-04

## 2023-10-04 NOTE — TELEPHONE ENCOUNTER
----- Message from JYOTI Pastrana sent at 9/27/2023  2:41 PM EDT -----  The breast biopsy was benign. She may return to routine screening annual mammogram and clinical breast exam. Follow up with any concerns.

## 2023-11-01 DIAGNOSIS — N95.2 VAGINAL ATROPHY: ICD-10-CM

## 2023-11-02 RX ORDER — ESTRADIOL 0.07 MG/D
FILM, EXTENDED RELEASE TRANSDERMAL
Qty: 8 PATCH | Refills: 6 | Status: SHIPPED | OUTPATIENT
Start: 2023-11-02

## 2023-11-02 NOTE — TELEPHONE ENCOUNTER
I received a refill on her Estradiol patches.  Rx last given with only 4 refills on 05/09/23.  I have approved 7 refills that have been sent to her pharmacy.  She was last seen on 05/09/23 and her next appointment is scheduled for 05/10/24.

## 2024-04-26 DIAGNOSIS — N95.2 VAGINAL ATROPHY: ICD-10-CM

## 2024-04-26 RX ORDER — ESTRADIOL 0.1 MG/G
CREAM VAGINAL
Qty: 42.5 G | Refills: 3 | OUTPATIENT
Start: 2024-04-26

## 2024-04-26 NOTE — TELEPHONE ENCOUNTER
I received a refill request for the pt's Estradiol .01% vaginal cream medication.  She was last seen on 05/09/23 and has an upcoming visit scheduled on 05/10/24.  I called the patient and she said that she has plenty to last her until her next appointment.  I have denied the refill request and she will get the Rx renewed at her visit with Bre Toscano.

## 2024-05-10 ENCOUNTER — OFFICE VISIT (OUTPATIENT)
Dept: OBSTETRICS AND GYNECOLOGY | Facility: CLINIC | Age: 64
End: 2024-05-10
Payer: MEDICARE

## 2024-05-10 VITALS
BODY MASS INDEX: 26.99 KG/M2 | HEIGHT: 65 IN | DIASTOLIC BLOOD PRESSURE: 78 MMHG | SYSTOLIC BLOOD PRESSURE: 134 MMHG | HEART RATE: 64 BPM | WEIGHT: 162 LBS

## 2024-05-10 DIAGNOSIS — N95.1 MENOPAUSAL SYMPTOMS: ICD-10-CM

## 2024-05-10 DIAGNOSIS — Z12.31 ENCOUNTER FOR SCREENING MAMMOGRAM FOR MALIGNANT NEOPLASM OF BREAST: ICD-10-CM

## 2024-05-10 DIAGNOSIS — Z01.419 WELL WOMAN EXAM: Primary | ICD-10-CM

## 2024-05-10 RX ORDER — ESTRADIOL 0.1 MG/G
1 CREAM VAGINAL 2 TIMES WEEKLY
Qty: 42.5 G | Refills: 3 | Status: SHIPPED | OUTPATIENT
Start: 2024-05-13

## 2024-05-10 RX ORDER — ESTRADIOL 0.05 MG/D
1 PATCH, EXTENDED RELEASE TRANSDERMAL 2 TIMES WEEKLY
Qty: 24 PATCH | Refills: 4 | Status: SHIPPED | OUTPATIENT
Start: 2024-05-13

## 2024-05-10 RX ORDER — MELOXICAM 7.5 MG/1
1 TABLET ORAL DAILY PRN
COMMUNITY
Start: 2024-05-09

## 2024-05-15 ENCOUNTER — TRANSCRIBE ORDERS (OUTPATIENT)
Dept: ADMINISTRATIVE | Facility: HOSPITAL | Age: 64
End: 2024-05-15
Payer: MEDICARE

## 2024-05-15 DIAGNOSIS — M54.12 CERVICAL RADICULOPATHY: Primary | ICD-10-CM

## (undated) DEVICE — EGD OR ERCP KIT: Brand: MEDLINE INDUSTRIES, INC.

## (undated) DEVICE — Device: Brand: SINGLE USE INJECTOR NM600/610

## (undated) DEVICE — Device: Brand: DEFENDO AIR/WATER/SUCTION AND BIOPSY VALVE

## (undated) DEVICE — SNAR E/S POLYP SNAREMASTER OVL/10MM 2.8X2300MM YEL

## (undated) DEVICE — COLON KIT: Brand: MEDLINE INDUSTRIES, INC.

## (undated) DEVICE — Device

## (undated) DEVICE — SINGLE-USE BIOPSY FORCEPS: Brand: RADIAL JAW 4

## (undated) DEVICE — PAD GRND REM POLYHESIVE A/ DISP

## (undated) DEVICE — SOLIDIFIER LIQLOC PLS 1500CC BT

## (undated) DEVICE — THE SINGLE USE ETRAP – POLYP TRAP IS USED FOR SUCTION RETRIEVAL OF ENDOSCOPICALLY REMOVED POLYPS.: Brand: ETRAP

## (undated) DEVICE — LINER SURG CANSTR SXN S/RIGD 1500CC

## (undated) DEVICE — SOL IRRG H2O PL/BG 1000ML STRL

## (undated) DEVICE — KT SYR GEL ORISE SNGL PK 10ML

## (undated) DEVICE — CONN JET HYDRA H20 AUXILIARY DISP